# Patient Record
Sex: FEMALE | Race: WHITE | Employment: FULL TIME | URBAN - METROPOLITAN AREA
[De-identification: names, ages, dates, MRNs, and addresses within clinical notes are randomized per-mention and may not be internally consistent; named-entity substitution may affect disease eponyms.]

---

## 2017-02-20 ENCOUNTER — TELEPHONE (OUTPATIENT)
Dept: OBGYN | Facility: CLINIC | Age: 42
End: 2017-02-20

## 2018-01-05 ENCOUNTER — OFFICE VISIT (OUTPATIENT)
Dept: FAMILY MEDICINE | Facility: CLINIC | Age: 43
End: 2018-01-05
Payer: COMMERCIAL

## 2018-01-05 VITALS
SYSTOLIC BLOOD PRESSURE: 88 MMHG | DIASTOLIC BLOOD PRESSURE: 56 MMHG | WEIGHT: 117.4 LBS | BODY MASS INDEX: 20.04 KG/M2 | RESPIRATION RATE: 14 BRPM | HEIGHT: 64 IN | OXYGEN SATURATION: 100 % | TEMPERATURE: 97.2 F | HEART RATE: 72 BPM

## 2018-01-05 DIAGNOSIS — H60.393 INFECTIVE OTITIS EXTERNA, BILATERAL: Primary | ICD-10-CM

## 2018-01-05 PROCEDURE — 99213 OFFICE O/P EST LOW 20 MIN: CPT | Performed by: FAMILY MEDICINE

## 2018-01-05 RX ORDER — NEOMYCIN SULFATE, POLYMYXIN B SULFATE AND HYDROCORTISONE 10; 3.5; 1 MG/ML; MG/ML; [USP'U]/ML
4 SUSPENSION/ DROPS AURICULAR (OTIC) 4 TIMES DAILY
Qty: 10 ML | Refills: 0 | Status: SHIPPED | OUTPATIENT
Start: 2018-01-05 | End: 2018-01-05

## 2018-01-05 RX ORDER — NEOMYCIN SULFATE, POLYMYXIN B SULFATE AND HYDROCORTISONE 10; 3.5; 1 MG/ML; MG/ML; [USP'U]/ML
4 SUSPENSION/ DROPS AURICULAR (OTIC) 4 TIMES DAILY
Qty: 10 ML | Refills: 3 | Status: SHIPPED | OUTPATIENT
Start: 2018-01-05 | End: 2018-01-25

## 2018-01-05 NOTE — PROGRESS NOTES
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 Also melting wax. Denies noticing any blood. Denies any foul smell. Denies any ear pain or hearing changes.   The patient had the ear concern for the last 6 months self treating with above medications/remides.   Patient Active Problem List   Diagnosis     Spinal stenosis in cervical region     Past Surgical History:   Procedure Laterality Date     ENT SURGERY      oral surgery as a child     FUSION CERVICAL ANTERIOR TWO LEVELS  1/14/2013    Procedure: FUSION CERVICAL ANTERIOR TWO LEVELS;  C5-6 AND C6-7 ANTERIOR DISCECTOMY AND FUSION WITH MIRTA ANTERIOR INSTRUMENTATION (C-ARM, MIDAS, CONTRAVES) ;  Surgeon: Vince Frederick MD;  Location:  OR       Social History   Substance Use Topics     Smoking status: Never Smoker     Smokeless tobacco: Never Used     Alcohol use 0.0 oz/week     0 Standard drinks or equivalent per week      Comment: 2 wine daily     Family History   Problem Relation Age of Onset     Breast Cancer Maternal Aunt      Pt's mother tested and negative     Uterine Cancer Maternal Grandmother          ROS:  Constitutional, HEENT, cardiovascular, pulmonary, gi and gu systems are negative, except as otherwise noted.      OBJECTIVE:   BP (!) 88/56  Pulse 72  Temp 97.2  F (36.2  C) (Tympanic)  Resp 14  Ht 5' 4.25\" (1.632 m)  Wt 117 lb " 6.4 oz (53.3 kg)  SpO2 100%  Breastfeeding? No  BMI 20 kg/m2  Body mass index is 20 kg/(m^2).  GENERAL: healthy, alert and no distress, younger than stated age.   HENT: erythema and crusting of bilateral external ear canals worse on the right than the left. TM are intact and ear canal is patent. Minimal wax was noted.   PSYCH: mentation appears normal, affect normal/bright    Diagnostic Test Results:  none     ASSESSMENT/PLAN:   1. Infective otitis externa, bilateral  Assessment: Patient has a hx of chronic otitis externa. Currently concerned about the excessive wax draining out of both ears. Patient was given a prescription.   Plan:  - neomycin-polymyxin-hydrocortisone (CORTISPORIN) 3.5-13619-8 otic suspension; Place 4 drops into both ears 4 times daily  Dispense: 10 mL; Refill: 0    Meg Sanabria MD  [facility]  PAGER: [phone]

## 2018-01-05 NOTE — MR AVS SNAPSHOT
After Visit Summary   1/5/2018    Shaheen Flaherty    MRN: [id number]           Patient Information     Date Of Birth          1975        Visit Information        Provider Department      1/5/2018 2:30 PM Meg Sanabria MD [facility]        Today's Diagnoses     Infective otitis externa, bilateral    -  1      Care Instructions      Anatomy of the Ear    The ear is a complex and delicate organ. It collects sound waves so you can hear the world around you. The ear also has a second function--it helps you keep your balance. Your ear can be divided into 3 parts. The outer ear and middle ear help collect and amplify sound. The inner ear converts sound waves to messages that are sent to the brain. The inner ear also senses the movement and position of your head and body so you can maintain your balance and see clearly, even when you change positions.  The mastoid bone surrounds the middle ear. The external ear collects sound waves. The ear canal carries sound waves to the eardrum. The eardrum vibrates from sound waves, setting the middle ear bones in motion. The middle ear bones (ossicles) vibrate, transmitting sound waves to the inner ear. When the ear is healthy, air pressure remains balanced in the middle ear. The eustachian tube helps control air pressure in the middle ear. The semicircular canals help maintain balance. The vestibular nerve carries balance signals to the brain. The auditory nerve carries sound signals to the brain. The cochlea picks up sound waves and makes nerve signals.     Date Last Reviewed: 10/1/2016    5095-5064 [employer]. [address] [place] [address]. All rights reserved. This information is not intended as a substitute for professional medical care. Always follow your healthcare professional's instructions.        External Ear Infection (Adult)    External otitis (also called  swimmer s ear ) is an infection in the ear canal. It  is often caused by bacteria or fungus. It can occur a few days after water gets trapped in the ear canal (from swimming or bathing). It can also occur after cleaning too deeply in the ear canal with a cotton swab or other object. Sometimes, hair care products get into the ear canal and cause this problem.  Symptoms can include pain, fever, itching, redness, drainage, or swelling of the ear canal. Temporary hearing loss may also occur.  Home care    Do not try to clean the ear canal. This can push pus and bacteria deeper into the canal.    Use prescribed ear drops as directed. These help reduce swelling and fight the infection. If an ear wick was placed in the ear canal, apply drops right onto the end of the wick. The wick will draw the medication into the ear canal even if it is swollen closed.    A cotton ball may be loosely placed in the outer ear to absorb any drainage.    You may use acetaminophen or ibuprofen to control pain, unless another medication was prescribed. Note: If you have chronic liver or kidney disease or ever had a stomach ulcer or GI bleeding, talk to your health care provider before taking any of these medications.    Do not allow water to get into your ear when bathing. Also, avoid swimming until the infection has cleared.  Prevention    Keep your ears dry. This helps lower the risk of infection. Dry your ears with a towel or hair dryer after getting wet. Also, use ear plugs when swimming.    Do not stick any objects in the ear to remove wax.    If you feel water trapped in your ear, use ear drops right away. You can get these drops over the counter at most drugstores. They work by removing water from the ear canal.  Follow-up care  Follow up with your health care provider in one week, or as advised.  When to seek medical advice  Call your health care provider right away if any of these occur:    Ear pain becomes worse or doesn t improve after 3 days of treatment    Redness or swelling of the  "outer ear occurs or gets worse    Headache    Painful or stiff neck    Drowsiness or confusion    Fever of 100.4 F (38 C) or higher, or as directed by your health care provider    Seizure  Date Last Reviewed: 3/22/2015    5073-5114 [employer]. [address] [place] [address], [place] [address]. All rights reserved. This information is not intended as a substitute for professional medical care. Always follow your healthcare professional's instructions.                Follow-ups after your visit        Your next 10 appointments already scheduled     Jan 09, 2018 11:30 AM CST   (Arrive by 11:15 AM)   MA SCREENING DIGITAL BILATERAL with SHBCMA2   [facility] [place] [facility] ([facility])    [address] [place] [address], [address]  [facility] [address] [phone]           Do not use any powder, lotion or deodorant under your arms or on your breast. If you do, we will ask you to remove it before your exam.  Wear comfortable, two-piece clothing.  If you have any allergies, tell your care team.  Bring any previous mammograms from other facilities or have them mailed to the breast center. Three-dimensional (3D) mammograms are available at [place] locations in [facility], [place], [facility], [place], [place], and [place]. [facility] locations include [place] and [facility] in [place]. Benefits of 3D mammograms include: - Improved rate of cancer detection - Decreases your chance of having to go back for more tests, which means fewer: - \"False-positive\" results (This means that there is an abnormal area but it isn't cancer.) - Invasive testing procedures, such as a biopsy or surgery - Can provide clearer images of the breast if you have dense breast tissue. 3D mammography is an optional exam that anyone can have with a 2D mammogram. It doesn't replace or take the place of a 2D mammogram. 2D mammograms remain an effective screening test for all women.  Not " "all insurance companies cover the cost of a 3D mammogram. Check with your insurance.              Who to contact     If you have questions or need follow up information about today's clinic visit or your schedule please contact [facility] directly at [phone].  Normal or non-critical lab and imaging results will be communicated to you by Ranberryhart, letter or phone within 4 business days after the clinic has received the results. If you do not hear from us within 7 days, please contact the clinic through Ranberry[employer] or phone. If you have a critical or abnormal lab result, we will notify you by phone as soon as possible.  Submit refill requests through [employer] or call your pharmacy and they will forward the refill request to us. Please allow 3 business days for your refill to be completed.          Additional Information About Your Visit        [facility] Information     [employer] lets you send messages to your doctor, view your test results, renew your prescriptions, schedule appointments and more. To sign up, go to [email or web address] . Click on \"Log in\" on the left side of the screen, which will take you to the Welcome page. Then click on \"Sign up Now\" on the right side of the page.     You will be asked to enter the access code listed below, as well as some personal information. Please follow the directions to create your username and password.     Your access code is: [email or web address]  Expires: 2018  3:02 PM     Your access code will  in 90 days. If you need help or a new code, please call your [facility] or [phone].        Care EveryWhere ID     This is your Care EveryWhere ID. This could be used by other organizations to access your [facility] medical records  [id number]        Your Vitals Were     Pulse Temperature Respirations Height Pulse Oximetry Breastfeeding?    72 97.2  F (36.2  C) (Tympanic) 14 5' 4.25\" (1.632 m) 100% No    BMI (Body Mass Index)                   20 kg/m2      "       Blood Pressure from Last 3 Encounters:   01/05/18 (!) 88/56   02/19/16 118/60   11/05/15 100/70    Weight from Last 3 Encounters:   01/05/18 117 lb 6.4 oz (53.3 kg)   02/19/16 116 lb (52.6 kg)   11/05/15 117 lb (53.1 kg)              Today, you had the following     No orders found for display         Today's Medication Changes          These changes are accurate as of: 1/5/18  3:02 PM.  If you have any questions, ask your nurse or doctor.               Start taking these medicines.        Dose/Directions    neomycin-polymyxin-hydrocortisone 3.5-84582-1 otic suspension   Commonly known as:  CORTISPORIN   Used for:  Infective otitis externa, bilateral   Started by:  Meg Sanabria MD        Dose:  4 drop   Place 4 drops into both ears 4 times daily   Quantity:  10 mL   Refills:  0            Where to get your medicines      These medications were sent to [facility] - [place], [place] - [address] [place] [id number] [place] [address]     Phone:  [phone]     neomycin-polymyxin-hydrocortisone 3.5-00310-8 otic suspension                Primary Care Provider Fax #    [facility] [phone] [address] [place] [address]        Equal Access to Services     NELDA DYE AH: Hadii randy ratliff hadasho Soleno, waaxda luqadaha, qaybta kaalmada malena, odalis jay. So [facility] [phone].    ATENCIÓN: Si habla español, tiene a brush disposición servicios gratuitos de asistencia lingüística. rTevor al [phone].    We comply with applicable federal civil rights laws and [place] laws. We do not discriminate on the basis of race, color, national origin, age, disability, sex, sexual orientation, or gender identity.            Thank you!     Thank you for choosing [facility]  for your care. Our goal is always to provide you with excellent care. Hearing back from our patients is one way we can  continue to improve our services. Please take a few minutes to complete the written survey that you may receive in the mail after your visit with us. Thank you!             Your Updated Medication List - Protect others around you: Learn how to safely use, store and throw away your medicines at www.disposemymeds.org.          This list is accurate as of: 1/5/18  3:02 PM.  Always use your most recent med list.                   Brand Name Dispense Instructions for use Diagnosis    multivitamin, therapeutic with minerals Tabs tablet      Take 1 tablet by mouth daily        neomycin-polymyxin-hydrocortisone 3.5-40421-8 otic suspension    CORTISPORIN    10 mL    Place 4 drops into both ears 4 times daily    Infective otitis externa, bilateral       valACYclovir 500 MG tablet    VALTREX    6 tablet    Take one tab by mouth twice daily for 3 days. Start at first onset of symptoms    HSV infection

## 2018-01-05 NOTE — NURSING NOTE
"Chief Complaint   Patient presents with     Establish Care     Ear Problem     possible infection-chronic swimmers ear x6 months       Initial BP (!) 88/56  Pulse 72  Temp 97.2  F (36.2  C) (Tympanic)  Resp 14  Ht 5' 4.25\" (1.632 m)  Wt 117 lb 6.4 oz (53.3 kg)  SpO2 100%  Breastfeeding? No  BMI 20 kg/m2 Estimated body mass index is 20 kg/(m^2) as calculated from the following:    Height as of this encounter: 5' 4.25\" (1.632 m).    Weight as of this encounter: 117 lb 6.4 oz (53.3 kg).  BP completed using cuff size: regular    Health Maintenance that is potentially due pending provider review:  Health Maintenance Due   Topic Date Due     TETANUS IMMUNIZATION (SYSTEM ASSIGNED)  03/14/1994     INFLUENZA VACCINE (SYSTEM ASSIGNED)  09/01/2017         Vaccines declined  "

## 2018-01-05 NOTE — PATIENT INSTRUCTIONS
Anatomy of the Ear    The ear is a complex and delicate organ. It collects sound waves so you can hear the world around you. The ear also has a second function--it helps you keep your balance. Your ear can be divided into 3 parts. The outer ear and middle ear help collect and amplify sound. The inner ear converts sound waves to messages that are sent to the brain. The inner ear also senses the movement and position of your head and body so you can maintain your balance and see clearly, even when you change positions.  The mastoid bone surrounds the middle ear. The external ear collects sound waves. The ear canal carries sound waves to the eardrum. The eardrum vibrates from sound waves, setting the middle ear bones in motion. The middle ear bones (ossicles) vibrate, transmitting sound waves to the inner ear. When the ear is healthy, air pressure remains balanced in the middle ear. The eustachian tube helps control air pressure in the middle ear. The semicircular canals help maintain balance. The vestibular nerve carries balance signals to the brain. The auditory nerve carries sound signals to the brain. The cochlea picks up sound waves and makes nerve signals.     Date Last Reviewed: 10/1/2016    8798-8124 [employer]. [address] [place] [address]. All rights reserved. This information is not intended as a substitute for professional medical care. Always follow your healthcare professional's instructions.        External Ear Infection (Adult)    External otitis (also called  swimmer s ear ) is an infection in the ear canal. It is often caused by bacteria or fungus. It can occur a few days after water gets trapped in the ear canal (from swimming or bathing). It can also occur after cleaning too deeply in the ear canal with a cotton swab or other object. Sometimes, hair care products get into the ear canal and cause this problem.  Symptoms can include pain, fever, itching, redness,  drainage, or swelling of the ear canal. Temporary hearing loss may also occur.  Home care    Do not try to clean the ear canal. This can push pus and bacteria deeper into the canal.    Use prescribed ear drops as directed. These help reduce swelling and fight the infection. If an ear wick was placed in the ear canal, apply drops right onto the end of the wick. The wick will draw the medication into the ear canal even if it is swollen closed.    A cotton ball may be loosely placed in the outer ear to absorb any drainage.    You may use acetaminophen or ibuprofen to control pain, unless another medication was prescribed. Note: If you have chronic liver or kidney disease or ever had a stomach ulcer or GI bleeding, talk to your health care provider before taking any of these medications.    Do not allow water to get into your ear when bathing. Also, avoid swimming until the infection has cleared.  Prevention    Keep your ears dry. This helps lower the risk of infection. Dry your ears with a towel or hair dryer after getting wet. Also, use ear plugs when swimming.    Do not stick any objects in the ear to remove wax.    If you feel water trapped in your ear, use ear drops right away. You can get these drops over the counter at most drugstores. They work by removing water from the ear canal.  Follow-up care  Follow up with your health care provider in one week, or as advised.  When to seek medical advice  Call your health care provider right away if any of these occur:    Ear pain becomes worse or doesn t improve after 3 days of treatment    Redness or swelling of the outer ear occurs or gets worse    Headache    Painful or stiff neck    Drowsiness or confusion    Fever of 100.4 F (38 C) or higher, or as directed by your health care provider    Seizure  Date Last Reviewed: 3/22/2015    4086-1039 [employer]. [address] [place] [address], [place] [address]. All rights reserved. This information is not intended as a  substitute for professional medical care. Always follow your healthcare professional's instructions.

## 2018-01-09 ENCOUNTER — HOSPITAL ENCOUNTER (OUTPATIENT)
Dept: MAMMOGRAPHY | Facility: CLINIC | Age: 43
Discharge: HOME | End: 2018-01-09
Attending: OBSTETRICS & GYNECOLOGY | Admitting: OBSTETRICS & GYNECOLOGY
Payer: COMMERCIAL

## 2018-01-09 DIAGNOSIS — Z12.31 VISIT FOR SCREENING MAMMOGRAM: ICD-10-CM

## 2018-01-09 PROCEDURE — 77067 SCR MAMMO BI INCL CAD: CPT

## 2018-01-25 ENCOUNTER — OFFICE VISIT (OUTPATIENT)
Dept: FAMILY MEDICINE | Facility: CLINIC | Age: 43
End: 2018-01-25
Payer: COMMERCIAL

## 2018-01-25 ENCOUNTER — NURSE TRIAGE (OUTPATIENT)
Dept: NURSING | Facility: CLINIC | Age: 43
End: 2018-01-25

## 2018-01-25 ENCOUNTER — TELEPHONE (OUTPATIENT)
Dept: FAMILY MEDICINE | Facility: CLINIC | Age: 43
End: 2018-01-25

## 2018-01-25 VITALS
BODY MASS INDEX: 20.33 KG/M2 | SYSTOLIC BLOOD PRESSURE: 96 MMHG | WEIGHT: 119.1 LBS | HEIGHT: 64 IN | DIASTOLIC BLOOD PRESSURE: 62 MMHG | TEMPERATURE: 99.9 F

## 2018-01-25 DIAGNOSIS — J02.0 PHARYNGITIS DUE TO STREPTOCOCCUS SPECIES: Primary | ICD-10-CM

## 2018-01-25 LAB
DEPRECATED S PYO AG THROAT QL EIA: ABNORMAL
SPECIMEN SOURCE: ABNORMAL

## 2018-01-25 PROCEDURE — 99213 OFFICE O/P EST LOW 20 MIN: CPT | Performed by: FAMILY MEDICINE

## 2018-01-25 PROCEDURE — 87880 STREP A ASSAY W/OPTIC: CPT | Performed by: FAMILY MEDICINE

## 2018-01-25 RX ORDER — AZITHROMYCIN 250 MG/1
TABLET, FILM COATED ORAL
Qty: 6 TABLET | Refills: 0 | Status: SHIPPED | OUTPATIENT
Start: 2018-01-25 | End: 2018-03-06

## 2018-01-25 NOTE — MR AVS SNAPSHOT
After Visit Summary   1/25/2018    Shaheen Flaherty    MRN: [id number]           Patient Information     Date Of Birth          1975        Visit Information        Provider Department      1/25/2018 4:45 PM Meg Sanbaria MD [facility]        Today's Diagnoses     Throat pain    -  1      Care Instructions       * PHARYNGITIS (Sore Throat),REPORT PENDING    Pharyngitis (sore throat) is often due to a virus, but can also be caused by the  strep  bacteria. This is called  strep throat . Both viral and strep infection can cause throat pain that is worse when swallowing, aching all over with headache and fever. Both types of infections are contagious. They may be spread by coughing, kissing or touching others after touching your mouth or nose, so wash your hands often.  A test has been done to determine whether or not you have strep throat. If it is positive for strep infection you will usually need to take antibiotics. If the test is negative, you probably have a viral pharyngitis, and antibiotic treatment will not help you recover.  HOME CARE:    If your symptoms are severe, rest at home for the first 2-3 days. If you are told that your test is positive for strep, you should be off work and school for the first two days of antibiotic treatment. After that, you will no longer be as contagious.    Children: Use acetaminophen (Tylenol) for fever, fussiness or discomfort. In infants over six months of age, you may use ibuprofen (Children's Motrin) instead of Tylenol. [NOTE: If your child has chronic liver or kidney disease or ever had a stomach ulcer or GI bleeding, talk with your doctor before using these medicines.]   (Aspirin should never be used in anyone under 18 years of age who is ill with a fever. It may cause severe liver damage.)  Adults: You may use acetaminophen (Tylenol) 650-1000 mg every 6 hours or ibuprofen (Motrin, Advil) 600 mg every 6-8 hours with food to control pain,  if you are able to take these medicines. [NOTE: If you have chronic liver or kidney disease or ever had a stomach ulcer or GI bleeding, talk with your doctor before using these medicines.]    Throat lozenges or sprays (Chloraseptic and others), or gargling with warm salt water will reduce throat pain. Dissolve 1/2 teaspoon of salt in 1 glass of warm water. This is especially useful just before meals.     FOLLOW UP with your doctor as advised by our staff if you are not improving over the next week.  GET PROMPT MEDICAL ATTENTION  if any of the following occur:    Fever over 101 F (38.3 C) for more than three days    New or worsening ear pain, sinus pain or headache    Unable to swallow liquids or open your mouth wide due to throat pain    Trouble breathing    Muffled voice    New rash       6941-5646 [employer]. [address] [place] [address]. All rights reserved. This information is not intended as a substitute for professional medical care. Always follow your healthcare professional's instructions.  This information has been modified by your health care provider with permission from the publisher.    Self-Care for Sore Throats    Sore throats happen for many reasons, such as colds, allergies, and infections caused by viruses or bacteria. In any case, your throat becomes red and sore. Your goal for self-care is to reduce your discomfort while giving your throat a chance to heal.  Moisten and soothe your throat  Tips include the following:    Try a sip of water first thing after waking up.    Keep your throat moist by drinking 6 or more glasses of clear liquids every day.    Run a cool-air humidifier in your room overnight.    Avoid cigarette smoke.     Suck on throat lozenges, cough drops, hard candy, ice chips, or frozen fruit-juice bars. Use the sugar-free versions if your diet or medical condition requires them.  Gargle to ease irritation  Gargling every hour or 2 can ease irritation.  Try gargling with 1 of these solutions:    1/4 teaspoon of salt in 1/2 cup of warm water    An over-the-counter anesthetic gargle  Use medicine for more relief  Over-the-counter medicine can reduce sore throat symptoms. Ask your pharmacist if you have questions about which medicine to use:    Ease pain with anesthetic sprays. Aspirin or an aspirin substitute also helps. Remember, never give aspirin to anyone 18 or younger, or if you are already taking blood thinners.     For sore throats caused by allergies, try antihistamines to block the allergic reaction.    Remember: unless a sore throat is caused by a bacterial infection, antibiotics won t help you.  Prevent future sore throats  Prevention tips include the following:    Stop smoking or reduce contact with secondhand smoke. Smoke irritates the tender throat lining.    Limit contact with pets and with allergy-causing substances, such as pollen and mold.    When you re around someone with a sore throat or cold, wash your hands often to keep viruses or bacteria from spreading.    Don t strain your vocal cords.  Call your healthcare provider  Contact your healthcare provider if you have:    A temperature over 101 F (38.3 C)    White spots on the throat    Great difficulty swallowing    Trouble breathing    A skin rash    Recent exposure to someone else with strep bacteria    Severe hoarseness and swollen glands in the neck or jaw   Date Last Reviewed: 8/1/2016 2000-2017 [employer]. [address] [place] [address]. All rights reserved. This information is not intended as a substitute for professional medical care. Always follow your healthcare professional's instructions.                Follow-ups after your visit        Follow-up notes from your care team     Return if symptoms worsen or fail to improve.      Who to contact     If you have questions or need follow up information about today's clinic visit or your schedule please contact  "[facility] directly at [phone].  Normal or non-critical lab and imaging results will be communicated to you by MyChart, letter or phone within 4 business days after the clinic has received the results. If you do not hear from us within 7 days, please contact the clinic through [employer] or phone. If you have a critical or abnormal lab result, we will notify you by phone as soon as possible.  Submit refill requests through [facility] or call your pharmacy and they will forward the refill request to us. Please allow 3 business days for your refill to be completed.          Additional Information About Your Visit        [employer] Information     [facility] lets you send messages to your doctor, view your test results, renew your prescriptions, schedule appointments and more. To sign up, go to [email or web address] . Click on \"Log in\" on the left side of the screen, which will take you to the Welcome page. Then click on \"Sign up Now\" on the right side of the page.     You will be asked to enter the access code listed below, as well as some personal information. Please follow the directions to create your username and password.     Your access code is: [email or web address]  Expires: 2018  3:02 PM     Your access code will  in 90 days. If you need help or a new code, please call your [facility] clinic or [phone].        Care EveryWhere ID     This is your Care EveryWhere ID. This could be used by other organizations to access your [facility] medical records  [id number]        Your Vitals Were     Temperature Height BMI (Body Mass Index)             99.9  F (37.7  C) (Tympanic) 5' 4.25\" (1.632 m) 20.28 kg/m2          Blood Pressure from Last 3 Encounters:   18 96/62   18 (!) 88/56   16 118/60    Weight from Last 3 Encounters:   18 119 lb 1.6 oz (54 kg)   18 117 lb 6.4 oz (53.3 kg)   16 116 lb (52.6 kg)              We Performed the Following     Rapid strep screen        " Primary Care Provider Office Phone # Fax #    [facility] [phone] [address]  [facility] [address]        Equal Access to Services     NELDA DYE : Hadii randy ku hadmerlineo Soomaali, waaxda luqadaha, qaybta kaalmada adeegyada, odalis mckenzie laHenriqueadrian jay. So [facility] [phone].    ATENCIÓN: Si habla español, tiene a brush disposición servicios gratuitos de asistencia lingüística. Llame al [phone].    We comply with applicable federal civil rights laws and [place] laws. We do not discriminate on the basis of race, color, national origin, age, disability, sex, sexual orientation, or gender identity.            Thank you!     Thank you for choosing [facility]  for your care. Our goal is always to provide you with excellent care. Hearing back from our patients is one way we can continue to improve our services. Please take a few minutes to complete the written survey that you may receive in the mail after your visit with us. Thank you!             Your Updated Medication List - Protect others around you: Learn how to safely use, store and throw away your medicines at www.disposemymeds.org.          This list is accurate as of 1/25/18  5:05 PM.  Always use your most recent med list.                   Brand Name Dispense Instructions for use Diagnosis    multivitamin, therapeutic with minerals Tabs tablet      Take 1 tablet by mouth daily        valACYclovir 500 MG tablet    VALTREX    6 tablet    Take one tab by mouth twice daily for 3 days. Start at first onset of symptoms    HSV infection

## 2018-01-25 NOTE — NURSING NOTE
"Chief Complaint   Patient presents with     Pharyngitis     ongoing,      BP 96/62  Temp 99.9  F (37.7  C) (Tympanic)  Ht 5' 4.25\" (1.632 m)  Wt 119 lb 1.6 oz (54 kg)  BMI 20.28 kg/m2 Estimated body mass index is 20.28 kg/(m^2) as calculated from the following:    Height as of this encounter: 5' 4.25\" (1.632 m).    Weight as of this encounter: 119 lb 1.6 oz (54 kg).  Medication Reconciliation: complete      Health Maintenance due pending provider review:  NONE    n/a    Shawanda Serra CMA  "

## 2018-01-25 NOTE — TELEPHONE ENCOUNTER
Reason for call:  Patient reporting a symptom    Symptom or request: sore throat, hurts all the time,  She had other symptoms like headache and fever but said they went away, now its the throat and it hurts badly, she was crying. Please advise.     Duration (how long have symptoms been present): Sunday    Have you been treated for this before? No    Additional comments:     Phone Number patient can be reached at:  Home number on file [phone] (home)    Best Time:  asap    Can we leave a detailed message on this number:  YES    Call taken on 1/25/2018 at 3:13 PM by Marla Reyes

## 2018-01-25 NOTE — PROGRESS NOTES
SUBJECTIVE:   Shaheen Flaherty is a 42 year old female who presents to clinic today for the following health issues:      RESPIRATORY SYMPTOMS      Duration: ongoing    Description  sore throat, facial pain/pressure, ear pain both and headache    Severity: moderate    Accompanying signs and symptoms: 3+ weeks ago dx with ear inf    History (predisposing factors):  none    Precipitating or alleviating factors: None    Therapies tried and outcome:  rest and fluids OTC NSAID    Patient presents to clinic for fevers and sore throat. Reports symptoms started after an ear infection 3 weeks ago. She is concerned that the otic ear drops caused a throat infection.  Reports  throat pain, unable to eat solids only liquids. Feeling warm but have not measured her tempeture. She denies any chest pain or shortness of breath. Is not aware of any sick exposure.      Problem list and histories reviewed & adjusted, as indicated.  Additional history: as documented    Patient Active Problem List   Diagnosis     Spinal stenosis in cervical region     Past Surgical History:   Procedure Laterality Date     ENT SURGERY      oral surgery as a child     FUSION CERVICAL ANTERIOR TWO LEVELS  1/14/2013    Procedure: FUSION CERVICAL ANTERIOR TWO LEVELS;  C5-6 AND C6-7 ANTERIOR DISCECTOMY AND FUSION WITH MIRTA ANTERIOR INSTRUMENTATION (C-ARM, MIDAS, CONTRAVES) ;  Surgeon: Vince Frederick MD;  Location:  OR       Social History   Substance Use Topics     Smoking status: Never Smoker     Smokeless tobacco: Never Used     Alcohol use 0.0 oz/week     0 Standard drinks or equivalent per week      Comment: 2 wine daily     Family History   Problem Relation Age of Onset     Breast Cancer Maternal Aunt      Pt's mother tested and negative     Uterine Cancer Maternal Grandmother            Reviewed and updated as needed this visit by clinical staff    ROS:  Constitutional, HEENT, cardiovascular, pulmonary, gi and gu systems are negative,  "except as otherwise noted.    OBJECTIVE:     BP 96/62  Temp 99.9  F (37.7  C) (Tympanic)  Ht 5' 4.25\" (1.632 m)  Wt 119 lb 1.6 oz (54 kg)  BMI 20.28 kg/m2  Body mass index is 20.28 kg/(m^2).  GENERAL: healthy, alert and no distress  HENT: Bulging erythematous right tympanic membrane.  Right tonsillar enlargement with erythema and petechia on the soft palate.  Right anterior cervical lymphadenopathy.  NECK: no adenopathy, no asymmetry, masses, or scars and thyroid normal to palpation  RESP: lungs clear to auscultation - no rales, rhonchi or wheezes  CV: regular rate and rhythm, normal S1 S2, no S3 or S4, no murmur, click or rub, no peripheral edema and peripheral pulses strong    Diagnostic Test Results:  Results for orders placed or performed in visit on 01/25/18 (from the past 24 hour(s))   Rapid strep screen   Result Value Ref Range    Specimen Description Throat     Rapid Strep A Screen (A)      POSITIVE: Group A Streptococcal antigen detected by immunoassay.       ASSESSMENT/PLAN:   1. Pharyngitis due to Streptococcus species  Assessment: Physical exam is consistent with an acute infection.  Rapid strep was pending when patient left the clinic prior to getting the results of rapid strep.  She was called with the results twice and we left a voice message after the second phone call.  Due to the patient's history of allergy to penicillin, prescription of azithromycin was sent to her pharmacy.  Plan:  - Rapid strep screen  - azithromycin (ZITHROMAX) 250 MG tablet; Two tablets first day, then one tablet daily for four days.  Dispense: 6 tablet; Refill: 0      **MS is sent to our triage team was called patient and inform her about the positive results as well the antibiotics.     Meg Sanabria MD  [facility]  PAGER: [phone]      "

## 2018-01-25 NOTE — PATIENT INSTRUCTIONS
* PHARYNGITIS (Sore Throat),REPORT PENDING    Pharyngitis (sore throat) is often due to a virus, but can also be caused by the  strep  bacteria. This is called  strep throat . Both viral and strep infection can cause throat pain that is worse when swallowing, aching all over with headache and fever. Both types of infections are contagious. They may be spread by coughing, kissing or touching others after touching your mouth or nose, so wash your hands often.  A test has been done to determine whether or not you have strep throat. If it is positive for strep infection you will usually need to take antibiotics. If the test is negative, you probably have a viral pharyngitis, and antibiotic treatment will not help you recover.  HOME CARE:    If your symptoms are severe, rest at home for the first 2-3 days. If you are told that your test is positive for strep, you should be off work and school for the first two days of antibiotic treatment. After that, you will no longer be as contagious.    Children: Use acetaminophen (Tylenol) for fever, fussiness or discomfort. In infants over six months of age, you may use ibuprofen (Children's Motrin) instead of Tylenol. [NOTE: If your child has chronic liver or kidney disease or ever had a stomach ulcer or GI bleeding, talk with your doctor before using these medicines.]   (Aspirin should never be used in anyone under 18 years of age who is ill with a fever. It may cause severe liver damage.)  Adults: You may use acetaminophen (Tylenol) 650-1000 mg every 6 hours or ibuprofen (Motrin, Advil) 600 mg every 6-8 hours with food to control pain, if you are able to take these medicines. [NOTE: If you have chronic liver or kidney disease or ever had a stomach ulcer or GI bleeding, talk with your doctor before using these medicines.]    Throat lozenges or sprays (Chloraseptic and others), or gargling with warm salt water will reduce throat pain. Dissolve 1/2 teaspoon of salt in 1 glass of  warm water. This is especially useful just before meals.     FOLLOW UP with your doctor as advised by our staff if you are not improving over the next week.  GET PROMPT MEDICAL ATTENTION  if any of the following occur:    Fever over 101 F (38.3 C) for more than three days    New or worsening ear pain, sinus pain or headache    Unable to swallow liquids or open your mouth wide due to throat pain    Trouble breathing    Muffled voice    New rash       9236-9536 [employer]. [address] [place] [address], [place] [address]. All rights reserved. This information is not intended as a substitute for professional medical care. Always follow your healthcare professional's instructions.  This information has been modified by your health care provider with permission from the publisher.    Self-Care for Sore Throats    Sore throats happen for many reasons, such as colds, allergies, and infections caused by viruses or bacteria. In any case, your throat becomes red and sore. Your goal for self-care is to reduce your discomfort while giving your throat a chance to heal.  Moisten and soothe your throat  Tips include the following:    Try a sip of water first thing after waking up.    Keep your throat moist by drinking 6 or more glasses of clear liquids every day.    Run a cool-air humidifier in your room overnight.    Avoid cigarette smoke.     Suck on throat lozenges, cough drops, hard candy, ice chips, or frozen fruit-juice bars. Use the sugar-free versions if your diet or medical condition requires them.  Gargle to ease irritation  Gargling every hour or 2 can ease irritation. Try gargling with 1 of these solutions:    1/4 teaspoon of salt in 1/2 cup of warm water    An over-the-counter anesthetic gargle  Use medicine for more relief  Over-the-counter medicine can reduce sore throat symptoms. Ask your pharmacist if you have questions about which medicine to use:    Ease pain with anesthetic sprays. Aspirin or an aspirin  substitute also helps. Remember, never give aspirin to anyone 18 or younger, or if you are already taking blood thinners.     For sore throats caused by allergies, try antihistamines to block the allergic reaction.    Remember: unless a sore throat is caused by a bacterial infection, antibiotics won t help you.  Prevent future sore throats  Prevention tips include the following:    Stop smoking or reduce contact with secondhand smoke. Smoke irritates the tender throat lining.    Limit contact with pets and with allergy-causing substances, such as pollen and mold.    When you re around someone with a sore throat or cold, wash your hands often to keep viruses or bacteria from spreading.    Don t strain your vocal cords.  Call your healthcare provider  Contact your healthcare provider if you have:    A temperature over 101 F (38.3 C)    White spots on the throat    Great difficulty swallowing    Trouble breathing    A skin rash    Recent exposure to someone else with strep bacteria    Severe hoarseness and swollen glands in the neck or jaw   Date Last Reviewed: 8/1/2016 2000-2017 [employer]. [address] [place] [address], [place] [address]. All rights reserved. This information is not intended as a substitute for professional medical care. Always follow your healthcare professional's instructions.

## 2018-01-25 NOTE — TELEPHONE ENCOUNTER
Patient seen 1/5/2018   States she saw FS and was given ear drops  Thinks the ear drops let to her sore throat  States throat is so sore it hurts to swallow and it hurts even when she isn't swallowing  Had a headache couple days ago - this is now goal  Has temp of 100.8  Temp intermittent  Taking Tylenol which helps  Advised f/u appt    Next 5 appointments (look out 90 days)     Jan 25, 2018  4:45 PM CST   SHORT with Meg Sanabria MD   [facility] ([facility])    [address] [facility] [address] [phone]                Latia EBCERRIL RN

## 2018-01-26 ENCOUNTER — TELEPHONE (OUTPATIENT)
Dept: FAMILY MEDICINE | Facility: CLINIC | Age: 43
End: 2018-01-26

## 2018-01-26 NOTE — TELEPHONE ENCOUNTER
Notes Recorded by Meg Sanabria MD on 1/25/2018 at 5:54 PM  Please call the patient with the results. I called and left a voice message.   Due to her allergy to PCN, we sent a prescription of azithromycin for tx of strep.   We hope she feels better.     Meg Sanabria MD

## 2018-01-26 NOTE — TELEPHONE ENCOUNTER
Shaheen returning clinic call, states no msg left.  Did advise of + strep throat test, and script ordered to [facility].  Reviewed infection control related to strep throat.      Additional Information    [1] Follow-up call to recent contact AND [2] information only call, no triage required    Protocols used: INFORMATION ONLY CALL-ADULT-

## 2018-01-27 ENCOUNTER — HOSPITAL ENCOUNTER (EMERGENCY)
Facility: CLINIC | Age: 43
Discharge: HOME | End: 2018-01-28
Attending: EMERGENCY MEDICINE | Admitting: EMERGENCY MEDICINE
Payer: COMMERCIAL

## 2018-01-27 ENCOUNTER — APPOINTMENT (OUTPATIENT)
Dept: CT IMAGING | Facility: CLINIC | Age: 43
End: 2018-01-27
Attending: EMERGENCY MEDICINE
Payer: COMMERCIAL

## 2018-01-27 DIAGNOSIS — R07.0 THROAT PAIN: ICD-10-CM

## 2018-01-27 DIAGNOSIS — J36 PERITONSILLAR ABSCESS: ICD-10-CM

## 2018-01-27 LAB
ANION GAP SERPL CALCULATED.3IONS-SCNC: 5 MMOL/L (ref 3–14)
BASOPHILS # BLD AUTO: 0 10E9/L (ref 0–0.2)
BASOPHILS NFR BLD AUTO: 0.3 %
BUN SERPL-MCNC: 8 MG/DL (ref 7–30)
CALCIUM SERPL-MCNC: 8.4 MG/DL (ref 8.5–10.1)
CHLORIDE SERPL-SCNC: 102 MMOL/L (ref 94–109)
CO2 SERPL-SCNC: 30 MMOL/L (ref 20–32)
CREAT SERPL-MCNC: 0.61 MG/DL (ref 0.52–1.04)
DEPRECATED S PYO AG THROAT QL EIA: NORMAL
DIFFERENTIAL METHOD BLD: ABNORMAL
EOSINOPHIL # BLD AUTO: 0.1 10E9/L (ref 0–0.7)
EOSINOPHIL NFR BLD AUTO: 1 %
ERYTHROCYTE [DISTWIDTH] IN BLOOD BY AUTOMATED COUNT: 13.1 % (ref 10–15)
GFR SERPL CREATININE-BSD FRML MDRD: >90 ML/MIN/1.7M2
GLUCOSE SERPL-MCNC: 98 MG/DL (ref 70–99)
HCT VFR BLD AUTO: 31.3 % (ref 35–47)
HGB BLD-MCNC: 10.4 G/DL (ref 11.7–15.7)
IMM GRANULOCYTES # BLD: 0 10E9/L (ref 0–0.4)
IMM GRANULOCYTES NFR BLD: 0.3 %
LYMPHOCYTES # BLD AUTO: 1 10E9/L (ref 0.8–5.3)
LYMPHOCYTES NFR BLD AUTO: 8.4 %
MCH RBC QN AUTO: 32.2 PG (ref 26.5–33)
MCHC RBC AUTO-ENTMCNC: 33.2 G/DL (ref 31.5–36.5)
MCV RBC AUTO: 97 FL (ref 78–100)
MONOCYTES # BLD AUTO: 1.4 10E9/L (ref 0–1.3)
MONOCYTES NFR BLD AUTO: 12.1 %
NEUTROPHILS # BLD AUTO: 8.9 10E9/L (ref 1.6–8.3)
NEUTROPHILS NFR BLD AUTO: 77.9 %
NRBC # BLD AUTO: 0 10*3/UL
NRBC BLD AUTO-RTO: 0 /100
PLATELET # BLD AUTO: 359 10E9/L (ref 150–450)
POTASSIUM SERPL-SCNC: 4 MMOL/L (ref 3.4–5.3)
RBC # BLD AUTO: 3.23 10E12/L (ref 3.8–5.2)
SODIUM SERPL-SCNC: 137 MMOL/L (ref 133–144)
SPECIMEN SOURCE: NORMAL
WBC # BLD AUTO: 11.5 10E9/L (ref 4–11)

## 2018-01-27 PROCEDURE — 70491 CT SOFT TISSUE NECK W/DYE: CPT

## 2018-01-27 PROCEDURE — 96361 HYDRATE IV INFUSION ADD-ON: CPT

## 2018-01-27 PROCEDURE — 96374 THER/PROPH/DIAG INJ IV PUSH: CPT | Mod: 59

## 2018-01-27 PROCEDURE — 87081 CULTURE SCREEN ONLY: CPT | Performed by: EMERGENCY MEDICINE

## 2018-01-27 PROCEDURE — 25000132 ZZH RX MED GY IP 250 OP 250 PS 637: Performed by: EMERGENCY MEDICINE

## 2018-01-27 PROCEDURE — 42700 I&D ABSCESS PERITONSILLAR: CPT

## 2018-01-27 PROCEDURE — 99284 EMERGENCY DEPT VISIT MOD MDM: CPT | Mod: 25

## 2018-01-27 PROCEDURE — 85025 COMPLETE CBC W/AUTO DIFF WBC: CPT | Performed by: EMERGENCY MEDICINE

## 2018-01-27 PROCEDURE — 25000128 H RX IP 250 OP 636: Performed by: EMERGENCY MEDICINE

## 2018-01-27 PROCEDURE — 99285 EMERGENCY DEPT VISIT HI MDM: CPT | Mod: 25

## 2018-01-27 PROCEDURE — 87880 STREP A ASSAY W/OPTIC: CPT | Performed by: EMERGENCY MEDICINE

## 2018-01-27 PROCEDURE — 25000125 ZZHC RX 250: Performed by: EMERGENCY MEDICINE

## 2018-01-27 PROCEDURE — 80048 BASIC METABOLIC PNL TOTAL CA: CPT | Performed by: EMERGENCY MEDICINE

## 2018-01-27 RX ORDER — KETOROLAC TROMETHAMINE 15 MG/ML
15 INJECTION, SOLUTION INTRAMUSCULAR; INTRAVENOUS ONCE
Status: COMPLETED | OUTPATIENT
Start: 2018-01-27 | End: 2018-01-27

## 2018-01-27 RX ORDER — SODIUM CHLORIDE 9 MG/ML
1000 INJECTION, SOLUTION INTRAVENOUS CONTINUOUS
Status: DISCONTINUED | OUTPATIENT
Start: 2018-01-27 | End: 2018-01-28 | Stop reason: HOSPADM

## 2018-01-27 RX ORDER — DIPHENHYDRAMINE HCL 12.5MG/5ML
25 LIQUID (ML) ORAL ONCE
Status: COMPLETED | OUTPATIENT
Start: 2018-01-27 | End: 2018-01-27

## 2018-01-27 RX ORDER — IOPAMIDOL 755 MG/ML
80 INJECTION, SOLUTION INTRAVASCULAR ONCE
Status: COMPLETED | OUTPATIENT
Start: 2018-01-27 | End: 2018-01-27

## 2018-01-27 RX ADMIN — TOPICAL ANESTHETIC 0.5 ML: 200 SPRAY DENTAL; PERIODONTAL at 23:46

## 2018-01-27 RX ADMIN — SODIUM CHLORIDE 60 ML: 9 INJECTION, SOLUTION INTRAVENOUS at 22:07

## 2018-01-27 RX ADMIN — IOPAMIDOL 80 ML: 755 INJECTION, SOLUTION INTRAVENOUS at 22:07

## 2018-01-27 RX ADMIN — SODIUM CHLORIDE 1000 ML: 9 INJECTION, SOLUTION INTRAVENOUS at 21:33

## 2018-01-27 RX ADMIN — KETOROLAC TROMETHAMINE 15 MG: 15 INJECTION, SOLUTION INTRAMUSCULAR; INTRAVENOUS at 21:33

## 2018-01-27 RX ADMIN — DIPHENHYDRAMINE HYDROCHLORIDE 25 MG: 25 SOLUTION ORAL at 20:52

## 2018-01-27 ASSESSMENT — ENCOUNTER SYMPTOMS
SORE THROAT: 1
VOMITING: 0
NAUSEA: 0
TROUBLE SWALLOWING: 1

## 2018-01-27 NOTE — ED AVS SNAPSHOT
Emergency Department    [address] [place] [address]    Phone:  [phone]    Fax:  [phone]                                       Shaheen Flaherty   MRN: [id number]    Department:   Emergency Department   Date of Visit:  1/27/2018           After Visit Summary Signature Page     I have received my discharge instructions, and my questions have been answered. I have discussed any challenges I see with this plan with the nurse or doctor.    ..........................................................................................................................................  Patient/Patient Representative Signature      ..........................................................................................................................................  Patient Representative Print Name and Relationship to Patient    ..................................................               ................................................  Date                                            Time    ..........................................................................................................................................  Reviewed by Signature/Title    ...................................................              ..............................................  Date                                                            Time

## 2018-01-27 NOTE — ED AVS SNAPSHOT
Emergency Department    [address] [facility] [address]    Phone:  [phone]    Fax:  [phone]                                       Shaheen Flaherty   MRN: [id number]    Department:   Emergency Department   Date of Visit:  1/27/2018           Patient Information     Date Of Birth          1975        Your diagnoses for this visit were:     Peritonsillar abscess     Throat pain        You were seen by Lesia Dejesus MD.      Follow-up Information     Follow up with Peewee Zavala MD. Schedule an appointment as soon as possible for a visit in 2 days.    Specialty:  Otolaryngology    Contact information:    [facility]  [address] [id number] [phone]          Discharge Instructions       Please return to the ED if notice worsening pain, muffled voice, drooling, difficulty swallowing or opening your mouth, fevers >101 or other acute changes.  Please use tylenol and ibuprofen for pain.  Drink plenty of fluids and rest.      Discharge Instructions  Sore Throat  You were seen today for a sore throat.  Most (>80%) sore throats are caused by a virus. Antibiotics do not help with viral infections, but you can fight off the virus on your own.  In this case, your sore throat would be treated with medications for your pain and fever.    Strep throat is a kind of sore throat caused by Group A streptococcus bacteria.  This type of sore throat is treated with antibiotics.  If you had a rapid test done today for strep throat and it did not show infection, we may do a culture. If the culture shows you have strep throat, we will call you and get you a prescription for antibiotics.  Generally, every Emergency Department visit should have a follow-up clinic visit with either a primary or a specialty clinic/provider. Please follow-up as instructed by your emergency provider today.  Return to the Emergency Department if:    If you have difficulty breathing.    If  you are drooling because you are unable to swallow.    You become dehydrated due to difficulty drinking. Signs of dehydration include weakness, dry mouth, and urinating less than 3 times per day.    If you develop swelling of the neck or tongue.    If you develop a high fever with either severe or unusual headache or stiff neck.    Treatment:      Pain relief -- Non-prescription pain medications, such as Tylenol  (acetaminophen) or Motrin , Advil  (ibuprofen) are usually recommended for pain.  Do not use a medicine that you are allergic to, or if your provider has told you not to use it.    Soft or liquid diet. Concentrate on liquids to keep yourself hydrated. Cold liquids (popsicles, ice cream, etc.) may feel good on your throat.  If you were given a prescription for medicine here today, be sure to read all of the information (including the package insert) that comes with your prescription.  This will include important information about the medicine, its side effects, and any warnings that you need to know about.  The pharmacist who fills the prescription can provide more information and answer questions you may have about the medicine.  If you have questions or concerns that the pharmacist cannot address, please call or return to the Emergency Department.   Remember that you can always come back to the Emergency Department if you are not able to see your regular provider in the amount of time listed above, if you get any new symptoms, or if there is anything that worries you.      Peritonsillar Abscess    A peritonsillar abscess is a collection of pus that forms near the tonsils. It is a complication of a bacterial infection of the tonsils (tonsillitis). The abscess causes one or both tonsils to swell. The infection and swelling may spread to nearby tissues. If tissues swell enough to block the throat, the condition can become life-threatening. It is also dangerous if the abscess bursts and the infection spreads  or is breathed into the lungs. The goal is to treat a peritonsillar abscess before it worsens and threatens your health.  Signs and symptoms of peritonsillar abscess    Severe sore throat (often worse on one side)    Swollen and enlarged tonsils    Fever and chills    Pain when swallowing or trouble opening the mouth    Voice changes     Drooling    Swollen or tender glands in the neck  Diagnosing peritonsillar abscess  Your healthcare provider will examine you and look inside your mouth and throat. You will be asked about your symptoms and health history. Tests or procedures may be done as well, including those listed below.    Throat swab. This test checks for infection. It is done by wiping a sterile cotton swab in the back of the throat. The swab is then sent to a lab for study.    Blood tests. These might be done to check how your body is responding to the infection.    Ultrasound or computed tomography (CT) scans. These tests provide images of the abscess. They also help rule out other problems.    Needle aspiration. This procedure removes a sample of pus from the abscess with a needle. The sample is then sent to a lab to check for infection. In some cases, all of the pus is removed from the abscess.  Treating peritonsillar abscess  The abscess itself can be treated. Treatment of the underlying infection is also needed. Common treatments are listed below.    Medicines. Antibiotics are needed to treat the underlying infection. These may be taken by mouth or given by IV. Pain relievers may also be given, if needed.    Drainage of the abscess. A procedure may be needed to drain the pus from the abscess. Pus may be removed from the abscess with a needle (needle aspiration). Or, a small incision is made in the abscess. The pus is then drained and suctioned from the throat and mouth. This is called incision and drainage.    Tonsillectomy. This is surgery to remove the tonsils. It may be done if the abscess does not  improve with medicines. It may also be done if you have frequent tonsil infections or abscesses.  Recovery and follow-up  Treating the bacterial infection generally relieves the problem. Once the infection resolves, you should recover completely. Follow up with your healthcare provider as directed. And if you develop another throat infection, see your healthcare provider promptly.  Date Last Reviewed: 11/1/2016 2000-2017 [employer]. [address] [place] [address], [place] [address]. All rights reserved. This information is not intended as a substitute for professional medical care. Always follow your healthcare professional's instructions.            24 Hour Appointment Hotline       To make an appointment at any [facility] call [phone] ([phone]). If you don't have a family doctor or clinic, we will help you find one. [facility] clinics are conveniently located to serve the needs of you and your family.             Review of your medicines      START taking        Dose / Directions Last dose taken    clindamycin 300 MG capsule   Commonly known as:  CLEOCIN   Dose:  300 mg   Quantity:  42 capsule        Take 1 capsule (300 mg) by mouth 3 times daily for 14 days   Refills:  0          Our records show that you are taking the medicines listed below. If these are incorrect, please call your family doctor or clinic.        Dose / Directions Last dose taken    azithromycin 250 MG tablet   Commonly known as:  ZITHROMAX   Quantity:  6 tablet        Two tablets first day, then one tablet daily for four days.   Refills:  0        multivitamin, therapeutic with minerals Tabs tablet   Dose:  1 tablet        Take 1 tablet by mouth daily   Refills:  0        valACYclovir 500 MG tablet   Commonly known as:  VALTREX   Quantity:  6 tablet        Take one tab by mouth twice daily for 3 days. Start at first onset of symptoms   Refills:  6                Prescriptions were sent or printed at these locations  (1 Prescription)                   Other Prescriptions                Printed at Department/Unit printer (1 of 1)         clindamycin (CLEOCIN) 300 MG capsule                Procedures and tests performed during your visit     Basic metabolic panel    Beta strep group A culture    CBC with platelets differential    Rapid strep screen    Soft tissue neck CT w contrast    Wound Culture Aerobic Bacterial      Orders Needing Specimen Collection     None      Pending Results     Date and Time Order Name Status Description    1/28/2018 0012 Wound Culture Aerobic Bacterial In process     1/27/2018 2134 Beta strep group A culture In process             Pending Culture Results     Date and Time Order Name Status Description    1/28/2018 0012 Wound Culture Aerobic Bacterial In process     1/27/2018 2134 Beta strep group A culture In process             Pending Results Instructions     If you had any lab results that were not finalized at the time of your Discharge, you can call the ED Lab Result RN at [phone]. You will be contacted by this team for any positive Lab results or changes in treatment. The nurses are available 7 days a week from 10A to 6:30P.  You can leave a message 24 hours per day and they will return your call.        Test Results From Your Hospital Stay        1/27/2018  9:52 PM      Component Results     Component Value Ref Range & Units Status    WBC 11.5 (H) 4.0 - 11.0 10e9/L Final    RBC Count 3.23 (L) 3.8 - 5.2 10e12/L Final    Hemoglobin 10.4 (L) 11.7 - 15.7 g/dL Final    Hematocrit 31.3 (L) 35.0 - 47.0 % Final    MCV 97 78 - 100 fl Final    MCH 32.2 26.5 - 33.0 pg Final    MCHC 33.2 31.5 - 36.5 g/dL Final    RDW 13.1 10.0 - 15.0 % Final    Platelet Count 359 150 - 450 10e9/L Final    Diff Method Automated Method  Final    % Neutrophils 77.9 % Final    % Lymphocytes 8.4 % Final    % Monocytes 12.1 % Final    % Eosinophils 1.0 % Final    % Basophils 0.3 % Final    % Immature Granulocytes 0.3 % Final     Nucleated RBCs 0 0 /100 Final    Absolute Neutrophil 8.9 (H) 1.6 - 8.3 10e9/L Final    Absolute Lymphocytes 1.0 0.8 - 5.3 10e9/L Final    Absolute Monocytes 1.4 (H) 0.0 - 1.3 10e9/L Final    Absolute Eosinophils 0.1 0.0 - 0.7 10e9/L Final    Absolute Basophils 0.0 0.0 - 0.2 10e9/L Final    Abs Immature Granulocytes 0.0 0 - 0.4 10e9/L Final    Absolute Nucleated RBC 0.0  Final         1/27/2018 10:07 PM      Component Results     Component Value Ref Range & Units Status    Sodium 137 133 - 144 mmol/L Final    Potassium 4.0 3.4 - 5.3 mmol/L Final    Chloride 102 94 - 109 mmol/L Final    Carbon Dioxide 30 20 - 32 mmol/L Final    Anion Gap 5 3 - 14 mmol/L Final    Glucose 98 70 - 99 mg/dL Final    Urea Nitrogen 8 7 - 30 mg/dL Final    Creatinine 0.61 0.52 - 1.04 mg/dL Final    GFR Estimate >90 >60 mL/min/1.7m2 Final    Non  GFR Calc    GFR Estimate If Black >90 >60 mL/min/1.7m2 Final    African American GFR Calc    Calcium 8.4 (L) 8.5 - 10.1 mg/dL Final         1/27/2018 10:35 PM      Component Results     Component    Specimen Description    Throat    Rapid Strep A Screen    NEGATIVE: No Group A streptococcal antigen detected by immunoassay, await culture report.         1/27/2018 10:28 PM      Narrative     CT SCAN OF THE NECK WITH CONTRAST  1/27/2018 10:09 PM     HISTORY: Evaluate for peritonsillar abscess, right tonsil swelling,  ear pain bilateral.     TECHNIQUE:  Axial images and coronal reformations. 80 mL Isovue-370  IV. Radiation dose for this scan was reduced using automated exposure  control, adjustment of the mA and/or kV according to patient size, or  iterative reconstruction technique.    COMPARISON: None.    FINDINGS: There is a large developing right peritonsillar abscess  measuring 2.6 x 2.7 x 2.5 cm. This is causing moderate narrowing of  the oropharynx. There are also some adjacent surrounding edematous  changes in the right lateral pharynx. A few mildly prominent lymph  nodes  noted. Visualized orbits and paranasal sinuses are normal. The  salivary glands are normal. Vascular structures are unremarkable.  Previous cervical spine fusion procedure noted.        Impression     IMPRESSION: Developing right peritonsillar abscess measuring 2.6 x 2.7  x 2.5 cm.    ELISABETH PEREZ MD         1/28/2018 12:52 AM         1/28/2018 12:20 AM                Clinical Quality Measure: Blood Pressure Screening     Your blood pressure was checked while you were in the emergency department today. The last reading we obtained was  BP: 101/61 . Please read the guidelines below about what these numbers mean and what you should do about them.  If your systolic blood pressure (the top number) is less than 120 and your diastolic blood pressure (the bottom number) is less than 80, then your blood pressure is normal. There is nothing more that you need to do about it.  If your systolic blood pressure (the top number) is 120-139 or your diastolic blood pressure (the bottom number) is 80-89, your blood pressure may be higher than it should be. You should have your blood pressure rechecked within a year by a primary care provider.  If your systolic blood pressure (the top number) is 140 or greater or your diastolic blood pressure (the bottom number) is 90 or greater, you may have high blood pressure. High blood pressure is treatable, but if left untreated over time it can put you at risk for heart attack, stroke, or kidney failure. You should have your blood pressure rechecked by a primary care provider within the next 4 weeks.  If your provider in the emergency department today gave you specific instructions to follow-up with your doctor or provider even sooner than that, you should follow that instruction and not wait for up to 4 weeks for your follow-up visit.        Thank you for choosing [facility]       Thank you for choosing [facility] for your care. Our goal is always to provide you with excellent care. Hearing back  "from our patients is one way we can continue to improve our services. Please take a few minutes to complete the written survey that you may receive in the mail after you visit with us. Thank you!        [employer] Information     [employer] lets you send messages to your doctor, view your test results, renew your prescriptions, schedule appointments and more. To sign up, go to [email or web address] . Click on \"Log in\" on the left side of the screen, which will take you to the Welcome page. Then click on \"Sign up Now\" on the right side of the page.     You will be asked to enter the access code listed below, as well as some personal information. Please follow the directions to create your username and password.     Your access code is: [email or web address]  Expires: 2018  3:02 PM     Your access code will  in 90 days. If you need help or a new code, please call your [facility] clinic or [phone].        Care EveryWhere ID     This is your Care EveryWhere ID. This could be used by other organizations to access your [facility] medical records  [id number]        Equal Access to Services     NELDA DYE : Hadisidoro Lemus, ghazala liu, nagi guy, odalis guzman . So [facility] [phone].    ATENCIÓN: Si habla español, tiene a brush disposición servicios gratuitos de asistencia lingüística. Trevor al [phone].    We comply with applicable federal civil rights laws and [place] laws. We do not discriminate on the basis of race, color, national origin, age, disability, sex, sexual orientation, or gender identity.            After Visit Summary       This is your record. Keep this with you and show to your community pharmacist(s) and doctor(s) at your next visit.                  "

## 2018-01-28 VITALS
HEIGHT: 64 IN | OXYGEN SATURATION: 99 % | TEMPERATURE: 98.1 F | SYSTOLIC BLOOD PRESSURE: 99 MMHG | RESPIRATION RATE: 18 BRPM | WEIGHT: 120 LBS | DIASTOLIC BLOOD PRESSURE: 68 MMHG | HEART RATE: 108 BPM | BODY MASS INDEX: 20.49 KG/M2

## 2018-01-28 PROCEDURE — 87147 CULTURE TYPE IMMUNOLOGIC: CPT | Performed by: EMERGENCY MEDICINE

## 2018-01-28 PROCEDURE — 87070 CULTURE OTHR SPECIMN AEROBIC: CPT | Performed by: EMERGENCY MEDICINE

## 2018-01-28 PROCEDURE — 25000132 ZZH RX MED GY IP 250 OP 250 PS 637: Performed by: EMERGENCY MEDICINE

## 2018-01-28 RX ORDER — CLINDAMYCIN HCL 300 MG
300 CAPSULE ORAL 3 TIMES DAILY
Qty: 42 CAPSULE | Refills: 0 | Status: SHIPPED | OUTPATIENT
Start: 2018-01-28 | End: 2018-02-11

## 2018-01-28 RX ORDER — CLINDAMYCIN HCL 300 MG
300 CAPSULE ORAL ONCE
Status: COMPLETED | OUTPATIENT
Start: 2018-01-28 | End: 2018-01-28

## 2018-01-28 RX ADMIN — CLINDAMYCIN HYDROCHLORIDE 300 MG: 300 CAPSULE ORAL at 00:32

## 2018-01-28 RX ADMIN — DEXAMETHASONE INTENSOL 10 MG: 1 SOLUTION, CONCENTRATE ORAL at 00:36

## 2018-01-28 NOTE — DISCHARGE INSTRUCTIONS
Please return to the ED if notice worsening pain, muffled voice, drooling, difficulty swallowing or opening your mouth, fevers >101 or other acute changes.  Please use tylenol and ibuprofen for pain.  Drink plenty of fluids and rest.      Discharge Instructions  Sore Throat  You were seen today for a sore throat.  Most (>80%) sore throats are caused by a virus. Antibiotics do not help with viral infections, but you can fight off the virus on your own.  In this case, your sore throat would be treated with medications for your pain and fever.    Strep throat is a kind of sore throat caused by Group A streptococcus bacteria.  This type of sore throat is treated with antibiotics.  If you had a rapid test done today for strep throat and it did not show infection, we may do a culture. If the culture shows you have strep throat, we will call you and get you a prescription for antibiotics.  Generally, every Emergency Department visit should have a follow-up clinic visit with either a primary or a specialty clinic/provider. Please follow-up as instructed by your emergency provider today.  Return to the Emergency Department if:    If you have difficulty breathing.    If you are drooling because you are unable to swallow.    You become dehydrated due to difficulty drinking. Signs of dehydration include weakness, dry mouth, and urinating less than 3 times per day.    If you develop swelling of the neck or tongue.    If you develop a high fever with either severe or unusual headache or stiff neck.    Treatment:      Pain relief -- Non-prescription pain medications, such as Tylenol  (acetaminophen) or Motrin , Advil  (ibuprofen) are usually recommended for pain.  Do not use a medicine that you are allergic to, or if your provider has told you not to use it.    Soft or liquid diet. Concentrate on liquids to keep yourself hydrated. Cold liquids (popsicles, ice cream, etc.) may feel good on your throat.  If you were given a  prescription for medicine here today, be sure to read all of the information (including the package insert) that comes with your prescription.  This will include important information about the medicine, its side effects, and any warnings that you need to know about.  The pharmacist who fills the prescription can provide more information and answer questions you may have about the medicine.  If you have questions or concerns that the pharmacist cannot address, please call or return to the Emergency Department.   Remember that you can always come back to the Emergency Department if you are not able to see your regular provider in the amount of time listed above, if you get any new symptoms, or if there is anything that worries you.      Peritonsillar Abscess    A peritonsillar abscess is a collection of pus that forms near the tonsils. It is a complication of a bacterial infection of the tonsils (tonsillitis). The abscess causes one or both tonsils to swell. The infection and swelling may spread to nearby tissues. If tissues swell enough to block the throat, the condition can become life-threatening. It is also dangerous if the abscess bursts and the infection spreads or is breathed into the lungs. The goal is to treat a peritonsillar abscess before it worsens and threatens your health.  Signs and symptoms of peritonsillar abscess    Severe sore throat (often worse on one side)    Swollen and enlarged tonsils    Fever and chills    Pain when swallowing or trouble opening the mouth    Voice changes     Drooling    Swollen or tender glands in the neck  Diagnosing peritonsillar abscess  Your healthcare provider will examine you and look inside your mouth and throat. You will be asked about your symptoms and health history. Tests or procedures may be done as well, including those listed below.    Throat swab. This test checks for infection. It is done by wiping a sterile cotton swab in the back of the throat. The swab is  then sent to a lab for study.    Blood tests. These might be done to check how your body is responding to the infection.    Ultrasound or computed tomography (CT) scans. These tests provide images of the abscess. They also help rule out other problems.    Needle aspiration. This procedure removes a sample of pus from the abscess with a needle. The sample is then sent to a lab to check for infection. In some cases, all of the pus is removed from the abscess.  Treating peritonsillar abscess  The abscess itself can be treated. Treatment of the underlying infection is also needed. Common treatments are listed below.    Medicines. Antibiotics are needed to treat the underlying infection. These may be taken by mouth or given by IV. Pain relievers may also be given, if needed.    Drainage of the abscess. A procedure may be needed to drain the pus from the abscess. Pus may be removed from the abscess with a needle (needle aspiration). Or, a small incision is made in the abscess. The pus is then drained and suctioned from the throat and mouth. This is called incision and drainage.    Tonsillectomy. This is surgery to remove the tonsils. It may be done if the abscess does not improve with medicines. It may also be done if you have frequent tonsil infections or abscesses.  Recovery and follow-up  Treating the bacterial infection generally relieves the problem. Once the infection resolves, you should recover completely. Follow up with your healthcare provider as directed. And if you develop another throat infection, see your healthcare provider promptly.  Date Last Reviewed: 11/1/2016 2000-2017 [employer]. [address] [place] [address], [place] [address]. All rights reserved. This information is not intended as a substitute for professional medical care. Always follow your healthcare professional's instructions.

## 2018-01-28 NOTE — ED PROVIDER NOTES
"  History     Chief Complaint:  Allergic Reaction    HPI   Shaheen Flaherty is a 42 year old female who presents with her parents for evaluation of an allergic reaction. Patient reports that she was diagnosed with strep throat on Wednesday, 3 days ago, and was prescribed Azithromycin which she had been taking. Patient also reports that prior to this, she had been diagnosed with otitis media on 1/5/18 and thought that otitis drops from the otitis media infection might have dropped to the back of her throat causing this infection.     Patient had been taking Azithromycin for these complications but now presents to the ED due to swelling of her throat and a mild headache. She suspects this to be an allergic reaction to the Zithromax as she is already allergic to PCNs. She adds that in addition to the swelling in her throat, she has also experienced difficulty eating hard foods, reports a mild headache and reports that she is experiencing such difficulty swallowing even her own spit. She denies nausea, vomiting, significant tongue swelling, ear pain, and reports that no one else is sick at home. Of note: Patient took her last dose of Zithromax at 1700 today and took a tylenol 800mg at 1800 for symptomatic treatment of her sore throat symptoms.     Allergies:  Penicillins      Medications:    Zithromax  Valtrex    Past Medical History:    Spinal stenosis  Genital herpes  Condyloma  Cervical disc herniation     Past Surgical History:    ENT Surgery  fUSION CERVICAL ANTERIOR     Family History:    No pertinent hx    Social History:  Smoking status: No  Alcohol use: Yes   Marital Status:  Single [1]     Review of Systems   HENT: Positive for sore throat and trouble swallowing.    Gastrointestinal: Negative for nausea and vomiting.   All other systems reviewed and are negative.      Physical Exam   First Vitals:  BP: 101/61  Pulse: 108  Temp: 98.1  F (36.7  C)  Resp: 18  Height: 162.6 cm (5' 4\")  Weight: 54.4 kg (120 " lb)  SpO2: 99 %      Physical Exam  Physical Exam   General: Resting on the bed.  Head: No obvious trauma to head.  Ears, Nose, Throat:  External ears normal.  Nose normal.  Significant right tonsillar erythema and swelling with midline shifted to the left.  Trismus noted.  Soft floor the mouth.  No exudates.  Eyes:  Conjunctivae clear.  Pupils are equal, round, and reactive.   Neck: Normal range of motion.  Neck supple.   CV: Regular rate and rhythm.  No murmurs.      Respiratory: Effort normal and breath sounds normal.  No wheezing or crackles.   Gastrointestinal: Soft.  No distension. There is no tenderness.    Skin: Skin is warm and dry.  No rash noted.     Emergency Department Course   Imaging:  Soft tissue neck CT w contrast   Final Result   IMPRESSION: Developing right peritonsillar abscess measuring 2.6 x 2.7   x 2.5 cm.      ELISABETH PEREZ MD            Laboratory:  CBC: WBC 11.5 (H) RBC 3.23L, HGB 10.4L, HCT 31.3L, Neutrophils 8.9 (H) Monocytes 1.4 (H)   BMP: WNL (Creat 0.61)  RS: Negative    Interventions:  2052: Benadryl  2133: NS 1L IV Bolus   2133: Toradol, 15 mg, IV injection      Procedure: Incision and Drainage   LOCATIONS:  Right PTA     ANESTHESIA:  Local field block using Lidocaine 1% plain and hurricaine spray, total of 1 mLs     PREPARATION:  none     PROCEDURE:  Area was incised with 18 G needle.  Wound treatment included Purulent Drainage.  Packing consisted of No Packing.  approximately 5 cc of purulent drainage     Patient Status:        Patient tolerated the procedure well. There were no complications.            Emergency Department Course:  Past medical records, nursing notes, and vitals reviewed.  2101: I performed an exam of the patient and obtained history, as documented above.       IV inserted and blood drawn for the above work up to be conducted.       Findings and plan explained to the Patient. Patient discharged home with instructions regarding supportive care, medications, and  reasons to return. The importance of close follow-up was reviewed.      Impression & Plan    Medical Decision Making:  Shaheen Flaherty is a 42 year old female who presents for evaluation of a sore throat and clinical evidence of peritonsillar abscess.  Initially mildly tachycardic.  Strep test negative.  CBC without significant leukocytosis and only mild leukocytosis which is consistent with peritonsillar abscess.  BMP without any acute electrolyte metabolic or renal abnormality.  CT soft tissue of the area reveals obvious peritonsillar abscess which was drained successfully by needle aspiration.  There is no clinical evidence of retropharyngeal abscess, Lemierre's Syndrome, epiglottis, or James's angina.   I have recommended further outpatient treatment with analgesics and antibiotics.  Given her significant swelling did give a dose of Decadron.  She is able to tolerate p.o. in the emergency department.  Her vital signs improved at this time not concerning for severe sepsis or septic shock.  Return if increasing pain, change in voice, cannot swallow, fever, or shortness of breath. Follow-up with ENT for recheck 1-2 days.  She voices understanding the plan and feels this is appropriate.  She will be going home with her mother tonight since she will have close observation.  She is discharged in improved condition.  Diagnosis:    ICD-10-CM    1. Peritonsillar abscess J36 Wound Culture Aerobic Bacterial   2. Throat pain R07.0        Disposition:  discharged to home    Discharge Medications:  New Prescriptions    CLINDAMYCIN (CLEOCIN) 300 MG CAPSULE    Take 1 capsule (300 mg) by mouth 3 times daily for 14 days     Guillermina AVELAR am serving as a scribe at 9:01 PM on 1/27/2018 to document services personally performed by Lesia Dejesus MD based on my observations and the provider's statements to me.      EMERGENCY DEPARTMENT       Lesia Dejesus MD  01/28/18 0132

## 2018-01-28 NOTE — ED NOTES
Called pt for re-evaluation pt states can barely speak and not able to swallow, attempted benadryl liquid pt unable to swallow.

## 2018-01-29 NOTE — TELEPHONE ENCOUNTER
FS,  FYI:  Patient went to ER 1/27/2018  Was started on a different abx (Clindamycin)  Latia BECERRIL RN

## 2018-01-30 ENCOUNTER — TELEPHONE (OUTPATIENT)
Dept: EMERGENCY MEDICINE | Facility: CLINIC | Age: 43
End: 2018-01-30

## 2018-01-30 LAB
BACTERIA SPEC CULT: ABNORMAL
BACTERIA SPEC CULT: NORMAL
Lab: ABNORMAL
SPECIMEN SOURCE: ABNORMAL
SPECIMEN SOURCE: NORMAL

## 2018-01-30 NOTE — TELEPHONE ENCOUNTER
[facility] [place] Emergency Department Lab result notification:    [place] ED lab result protocol used  General Culture Protocol - Abscess    Reason for call  Notify of lab results, assess symptoms,  review ED providers recommendations/discharge instructions (if necessary) and advise per ED lab result f/u protocol    Lab Result  Final Wound culture report on 01/30/2018  [place] ED discharge antibiotic: Clindamycin (Cleocin) 300 mg PO capsule, Take 1 capsule (300 mg) by mouth 3 times daily for 14 days  #1. Bacteria, Moderate growth Beta hemolytic Streptococcus group A, which is NOT TESTED to ED discharge antibiotic  Incision and Drainage performed in [facility] ED [Yes / No]: Yes  Patient to be notified of result, symptoms's assessed and advised per [place] ED lab result protocol.  Information table from ED Provider visit on 01/27/2018  Symptoms reported at ED visit (Chief complaint, HPI) Patient had been taking Azithromycin for these complications but now presents to the ED due to swelling of her throat and a mild headache. She suspects this to be an allergic reaction to the Zithromax as she is already allergic to PCNs. She adds that in addition to the swelling in her throat, she has also experienced difficulty eating hard foods, reports a mild headache and reports that she is experiencing such difficulty swallowing even her own spit. She denies nausea, vomiting, significant tongue swelling, ear pain, and reports that no one else is sick at home. Of note: Patient took her last dose of Zithromax at 1700 today and took a tylenol 800mg at 1800 for symptomatic treatment of her sore throat symptoms.    ED providers Impression and Plan (applicable information) 42 year old female who presents for evaluation of a sore throat and clinical evidence of peritonsillar abscess.  Initially mildly tachycardic.  Strep test negative.  CBC without significant leukocytosis and only mild leukocytosis which is consistent with  peritonsillar abscess.  BMP without any acute electrolyte metabolic or renal abnormality.  CT soft tissue of the area reveals obvious peritonsillar abscess which was drained successfully by needle aspiration.  There is no clinical evidence of retropharyngeal abscess, Lemierre's Syndrome, epiglottis, or James's angina.   I have recommended further outpatient treatment with analgesics and antibiotics.  Given her significant swelling did give a dose of Decadron.  She is able to tolerate p.o. in the emergency department.  Her vital signs improved at this time not concerning for severe sepsis or septic shock.  Return if increasing pain, change in voice, cannot swallow, fever, or shortness of breath. Follow-up with ENT for recheck 1-2 days.  She voices understanding the plan and feels this is appropriate.  She will be going home with her mother tonight since she will have close observation.  She is discharged in improved condition.     RN Assessment (Patient s current Symptoms), include time called.  [Insert Left message here if message left]  At 1557 Left voicemail message requesting a call back to [phone] between 10 a.m. and 6:30 p.m., 7 days a week for patient's ED/UC lab results.  May leave a message 24/7, if no one available.     Marybel Terry, RN  [facility] [employer] RN  Lung Nodule and ED Lab Result F/u RN  Epic pool (ED late result f/u RN): [id number]  FV INCIDENTAL RADIOLOGY F/U NURSES: P [id number]  # [phone]      Copy of Lab result   Exam Information   Exam Date Exam Time Accession # Results    1/28/18 12:05 AM [id number]    Component Results   Component Collected Lab   Specimen Description 01/28/2018 12:05    Throat   Special Requests 01/28/2018 12:05 AM 75   Specimen collected in eSwab transport (white cap)   Culture Micro (Abnormal) 01/28/2018 12:05    Moderate growth   Beta hemolytic Streptococcus group A   Susceptibility testing not routinely done

## 2018-01-31 NOTE — TELEPHONE ENCOUNTER
"Shaheen notified of Culture result.  She was advised to relay this information to her PCP as she was seen by her PCP yesterday d/t the abscess returning.  Advised to continue the Clindamycin.  (She is taking currently)  Reports symptoms are much better.  \"I can tell it feels much better today.\"    Contact your PCP clinic or return to the Emergency department if your:    Symptoms return.    Symptoms do not resolve after completing antibiotic.    Symptoms worsen or other concerning symptom's.    Constantin Garrett RN  [facility] RN  Lung Nodule and ED Lab Result F/u RN  Epic pool (ED late result f/u RN): [id number]  FV INCIDENTAL RADIOLOGY F/U NURSES: P [id number]  # [phone]    "

## 2018-03-06 ENCOUNTER — OFFICE VISIT (OUTPATIENT)
Dept: FAMILY MEDICINE | Facility: CLINIC | Age: 43
End: 2018-03-06
Payer: COMMERCIAL

## 2018-03-06 VITALS
OXYGEN SATURATION: 99 % | TEMPERATURE: 98.7 F | DIASTOLIC BLOOD PRESSURE: 64 MMHG | HEIGHT: 64 IN | BODY MASS INDEX: 20.14 KG/M2 | SYSTOLIC BLOOD PRESSURE: 100 MMHG | HEART RATE: 77 BPM | WEIGHT: 118 LBS | RESPIRATION RATE: 14 BRPM

## 2018-03-06 DIAGNOSIS — G47.20 SLEEP STAGE DYSFUNCTION: ICD-10-CM

## 2018-03-06 DIAGNOSIS — Z00.00 ROUTINE GENERAL MEDICAL EXAMINATION AT A HEALTH CARE FACILITY: Primary | ICD-10-CM

## 2018-03-06 DIAGNOSIS — D50.8 OTHER IRON DEFICIENCY ANEMIA: ICD-10-CM

## 2018-03-06 DIAGNOSIS — Z83.49 FAMILY HISTORY OF THYROID DISEASE: ICD-10-CM

## 2018-03-06 DIAGNOSIS — M48.02 SPINAL STENOSIS IN CERVICAL REGION: ICD-10-CM

## 2018-03-06 DIAGNOSIS — N92.6 MISSED PERIODS: ICD-10-CM

## 2018-03-06 DIAGNOSIS — Z12.4 SCREENING FOR MALIGNANT NEOPLASM OF CERVIX: ICD-10-CM

## 2018-03-06 LAB
RETICS # AUTO: 71 10E9/L (ref 25–95)
RETICS/RBC NFR AUTO: 2 % (ref 0.5–2)

## 2018-03-06 PROCEDURE — 99396 PREV VISIT EST AGE 40-64: CPT | Performed by: FAMILY MEDICINE

## 2018-03-06 PROCEDURE — 85060 BLOOD SMEAR INTERPRETATION: CPT | Performed by: FAMILY MEDICINE

## 2018-03-06 PROCEDURE — 84443 ASSAY THYROID STIM HORMONE: CPT | Performed by: FAMILY MEDICINE

## 2018-03-06 PROCEDURE — 85025 COMPLETE CBC W/AUTO DIFF WBC: CPT | Performed by: FAMILY MEDICINE

## 2018-03-06 PROCEDURE — G0145 SCR C/V CYTO,THINLAYER,RESCR: HCPCS | Performed by: FAMILY MEDICINE

## 2018-03-06 PROCEDURE — 36415 COLL VENOUS BLD VENIPUNCTURE: CPT | Performed by: FAMILY MEDICINE

## 2018-03-06 PROCEDURE — 82728 ASSAY OF FERRITIN: CPT | Performed by: FAMILY MEDICINE

## 2018-03-06 PROCEDURE — 87624 HPV HI-RISK TYP POOLED RSLT: CPT | Performed by: FAMILY MEDICINE

## 2018-03-06 PROCEDURE — 85045 AUTOMATED RETICULOCYTE COUNT: CPT | Performed by: FAMILY MEDICINE

## 2018-03-06 NOTE — MR AVS SNAPSHOT
After Visit Summary   3/6/2018    Shaheen Flaherty    MRN: [id number]           Patient Information     Date Of Birth          1975        Visit Information        Provider Department      3/6/2018 12:00 PM Elaina Douglas MD [facility]        Today's Diagnoses     Routine general medical examination at a health care facility    -  1    Family history of thyroid disease        Missed periods        Screening for malignant neoplasm of cervix        Sleep stage dysfunction        Other iron deficiency anemia        Spinal stenosis in cervical region          Care Instructions      Preventive Health Recommendations  Female Ages 40 to 49    Yearly exam:     See your health care provider every year in order to  1. Review health changes.   2. Discuss preventive care.    3. Review your medicines if your doctor prescribed any.      Get a Pap test every three years (unless you have an abnormal result and your provider advises testing more often).      If you get Pap tests with HPV test, you only need to test every 5 years, unless you have an abnormal result. You do not need a Pap test if your uterus was removed (hysterectomy) and you have not had cancer.      You should be tested each year for STDs (sexually transmitted diseases), if you're at risk.       Ask your doctor if you should have a mammogram.      Have a colonoscopy (test for colon cancer) if someone in your family has had colon cancer or polyps before age 50.       Have a cholesterol test every 5 years.       Have a diabetes test (fasting glucose) after age 45. If you are at risk for diabetes, you should have this test every 3 years.    Shots: Get a flu shot each year. Get a tetanus shot every 10 years.     Nutrition:     Eat at least 5 servings of fruits and vegetables each day.    Eat whole-grain bread, whole-wheat pasta and brown rice instead of white grains and rice.    Talk to your provider about Calcium and Vitamin D.  "    Lifestyle    Exercise at least 150 minutes a week (an average of 30 minutes a day, 5 days a week). This will help you control your weight and prevent disease.    Limit alcohol to one drink per day.    No smoking.     Wear sunscreen to prevent skin cancer.    See your dentist every six months for an exam and cleaning.          Follow-ups after your visit        Who to contact     If you have questions or need follow up information about today's clinic visit or your schedule please contact [facility] directly at [phone].  Normal or non-critical lab and imaging results will be communicated to you by [employer], letter or phone within 4 business days after the clinic has received the results. If you do not hear from us within 7 days, please contact the clinic through [employer] or phone. If you have a critical or abnormal lab result, we will notify you by phone as soon as possible.  Submit refill requests through [employer] or call your pharmacy and they will forward the refill request to us. Please allow 3 business days for your refill to be completed.          Additional Information About Your Visit        [employer] Information     [employer] lets you send messages to your doctor, view your test results, renew your prescriptions, schedule appointments and more. To sign up, go to [email or web address] . Click on \"Log in\" on the left side of the screen, which will take you to the Welcome page. Then click on \"Sign up Now\" on the right side of the page.     You will be asked to enter the access code listed below, as well as some personal information. Please follow the directions to create your username and password.     Your access code is: [email or web address]  Expires: 2018  3:02 PM     Your access code will  in 90 days. If you need help or a new code, please call your [facility] or [phone].        Care EveryWhere ID     This is your Care EveryWhere ID. This could be used by other organizations to " "access your [facility] medical records  [id number]        Your Vitals Were     Pulse Temperature Respirations Height Last Period Pulse Oximetry    77 98.7  F (37.1  C) (Tympanic) 14 5' 4\" (1.626 m) 01/28/2018 99%    Breastfeeding? BMI (Body Mass Index)                No 20.25 kg/m2           Blood Pressure from Last 3 Encounters:   03/06/18 100/64   01/28/18 99/68   01/25/18 96/62    Weight from Last 3 Encounters:   03/06/18 118 lb (53.5 kg)   01/27/18 120 lb (54.4 kg)   01/25/18 119 lb 1.6 oz (54 kg)              We Performed the Following     Blood Morphology Pathologist Review     CBC with platelets differential     Ferritin     HPV High Risk Types DNA Cervical     Pap imaged thin layer screen with HPV - recommended age 30 - 65 years (select HPV order below)     Reticulocyte Count     TSH with free T4 reflex        Primary Care Provider Office Phone # Fax #    [facility] [phone] [address]  [facility] [address]        Equal Access to Services     NELDA DYE : Hadii randy ratliff hadasho Soleno, waaxda luqadaha, qaybta kaalmada malena, odalis guzman . So [facility] [phone].    ATENCIÓN: Si habla español, tiene a brush disposición servicios gratuitos de asistencia lingüística. Trevor al [phone].    We comply with applicable federal civil rights laws and [place] laws. We do not discriminate on the basis of race, color, national origin, age, disability, sex, sexual orientation, or gender identity.            Thank you!     Thank you for choosing [facility]  for your care. Our goal is always to provide you with excellent care. Hearing back from our patients is one way we can continue to improve our services. Please take a few minutes to complete the written survey that you may receive in the mail after your visit with us. Thank you!             Your Updated Medication List - Protect others around you: Learn how to safely use, " store and throw away your medicines at www.disposemymeds.org.          This list is accurate as of 3/6/18  1:09 PM.  Always use your most recent med list.                   Brand Name Dispense Instructions for use Diagnosis    multivitamin, therapeutic with minerals Tabs tablet      Take 1 tablet by mouth daily        valACYclovir 500 MG tablet    VALTREX    6 tablet    Take one tab by mouth twice daily for 3 days. Start at first onset of symptoms    HSV infection

## 2018-03-06 NOTE — LETTER
March 14, 2018    Shaheen Flaherty  [address]   [facility] [address]    Dear Shaheen,  We are happy to inform you that your PAP smear result from 03/06/18 is normal.  We are now able to do a follow up test on PAP smears. The DNA test is for HPV (Human Papilloma Virus). Cervical cancer is closely linked with certain types of HPV. Your result showed no evidence of high risk HPV.  Therefore we recommend you return in 5 years for your next pap smear and HPV test.  You will still need to return to the clinic every year for an annual exam and other preventive tests.  Please contact the clinic at [phone] with any questions.  Sincerely,    Elaina Douglas MD/darryn

## 2018-03-06 NOTE — PROGRESS NOTES
SUBJECTIVE:   CC: Shaheen Flaherty is an 42 year old woman who presents for preventive health visit.   She would  like thyroid check, wondering if thyroid off and causing sleep problems at night  Having difficulty staying asleep between 2-4 am- wakes up to go to the bathroom only at night  Not having burning in urine at al   Works in pm- so waking up late   No naps druing the day  Has taken tylenol pm only as needed  - tries not to un;less desparate  Regular periods- except this month- over due by a week  No sexual activity for 6 month- resumed only 2 days ago  Not worried about pregnancy    Healthy Habits:    Do you get at least three servings of calcium containing foods daily (dairy, green leafy vegetables, etc.)? yes    Amount of exercise or daily activities, outside of work: 2-3 day(s) per week    Problems taking medications regularly No    Medication side effects: No    Have you had an eye exam in the past two years? no    Do you see a dentist twice per year? yes    Do you have sleep apnea, excessive snoring or daytime drowsiness?no      PROBLEMS TO ADD ON...    Today's PHQ-2 Score:   PHQ-2 ( 1999 [employer]) 3/6/2018 11/5/2015   Q1: Little interest or pleasure in doing things - 0   Q2: Feeling down, depressed or hopeless - 0   PHQ-2 Score - 0   PHQ-2 Score Incomplete -       Abuse: Current or Past(Physical, Sexual or Emotional)- No  Do you feel safe in your environment - Yes    Social History   Substance Use Topics     Smoking status: Never Smoker     Smokeless tobacco: Never Used     Alcohol use 0.0 oz/week     0 Standard drinks or equivalent per week      Comment: 2 wine daily     If you drink alcohol do you typically have >3 drinks per day or >7 drinks per week? No                     Reviewed orders with patient.  Reviewed health maintenance and updated orders accordingly - Yes  Labs reviewed in EPIC    Mammogram everyother year    Pertinent mammograms are reviewed under the imaging tab.  History of  "abnormal Pap smear: NO - age 30-65 PAP every 5 years with negative HPV co-testing recommended    Reviewed and updated as needed this visit by clinical staff  Tobacco  Allergies  Meds  Med Hx  Surg Hx  Fam Hx  Soc Hx        Reviewed and updated as needed this visit by Provider            ROS:  C: NEGATIVE for fever, chills, change in weight  I: NEGATIVE for worrisome rashes, moles or lesions  E: NEGATIVE for vision changes or irritation  ENT: NEGATIVE for ear, mouth and throat problems  R: NEGATIVE for significant cough or SOB  B: NEGATIVE for masses, tenderness or discharge  CV: NEGATIVE for chest pain, palpitations or peripheral edema  GI: NEGATIVE for nausea, abdominal pain, heartburn, or change in bowel habits  : NEGATIVE for unusual urinary or vaginal symptoms. Periods are regular.  M: NEGATIVE for significant arthralgias or myalgia  N: NEGATIVE for weakness, dizziness or paresthesias  P: NEGATIVE for changes in mood or affect    OBJECTIVE:   /64  Pulse 77  Temp 98.7  F (37.1  C) (Tympanic)  Resp 14  Ht 5' 4\" (1.626 m)  Wt 118 lb (53.5 kg)  LMP 01/28/2018  SpO2 99%  Breastfeeding? No  BMI 20.25 kg/m2  EXAM:  GENERAL: healthy, alert and no distress  EYES: Eyes grossly normal to inspection, PERRL and conjunctivae and sclerae normal  HENT: ear canals and TM's normal, nose and mouth without ulcers or lesions  NECK: no adenopathy, no asymmetry, masses, or scars and thyroid normal to palpation  RESP: lungs clear to auscultation - no rales, rhonchi or wheezes  BREAST: normal without masses, tenderness or nipple discharge and no palpable axillary masses or adenopathy  CV: regular rate and rhythm, normal S1 S2, no S3 or S4, no murmur, click or rub, no peripheral edema and peripheral pulses strong  ABDOMEN: soft, nontender, no hepatosplenomegaly, no masses and bowel sounds normal   (female): normal female external genitalia, normal urethral meatus, vaginal mucosa pink, moist, well rugated, and " normal cervix/adnexa/uterus without masses or discharge  MS: no gross musculoskeletal defects noted, no edema  SKIN: no suspicious lesions or rashes  NEURO: Normal strength and tone, mentation intact and speech normal  PSYCH: mentation appears normal, affect normal/bright    ASSESSMENT/PLAN:   (Z00.00) Routine general medical examination at a health care facility  (primary encounter diagnosis)  Comment: Plan: Please see patient instructions     (Z83.49) Family history of thyroid disease  Comment: mom  Plan: TSH with free T4 reflex            (N92.6) Missed periods  Comment: Plan: advised home urine pregnancy test. Patient declined urine pregnancy test in the clinic  Menstrual calender to be maintained, marking first day of ever period and it should fall within normal limits of 21-35 days from the first day of normal menstrual blood flow        (Z12.4) Screening for malignant neoplasm of cervix  Comment: Plan: Pap imaged thin layer screen with HPV -         recommended age 30 - 65 years (select HPV order        below), HPV High Risk Types DNA Cervical            (G47.20) Sleep stage dysfunction  Comment: Plan: Sleep hygiene- discussed in detail   Advised to Wake up same time daily  Stay active during the day, avoid naps and caffeine  Keep bedroom dark, quite and cool,   and no screen time for at least 30 mins prior to bed time  Leave bed if unable to sleep, may try to read something outside bedroom and return when sleepy            (D50.8) Other iron deficiency anemia  Comment: Plan: reports has been taking iron tabs daily for a month  Will recheck and investigate if iron deficient  If low in ferritin- advised to take over the counter everyother day for 1 month and repeat- otherwise stop iron for now  Ferritin, Blood Morphology Pathologist Review,         CBC with platelets differential, Reticulocyte         Count              COUNSELING:   Reviewed preventive health counseling, as reflected in patient instructions    "    Regular exercise       Healthy diet/nutrition         reports that she has never smoked. She has never used smokeless tobacco.    Estimated body mass index is 20.25 kg/(m^2) as calculated from the following:    Height as of this encounter: 5' 4\" (1.626 m).    Weight as of this encounter: 118 lb (53.5 kg).       Counseling Resources:  ATP IV Guidelines  Pooled Cohorts Equation Calculator  Breast Cancer Risk Calculator  FRAX Risk Assessment  ICSI Preventive Guidelines  Dietary Guidelines for Americans, 2010  [employer]'s MyPlate  ASA Prophylaxis  Lung CA Screening    Elaina Douglas MD  [facility] CLINICAnswers for HPI/ROS submitted by the patient on 3/6/2018   Annual Exam:  Getting at least 3 servings of Calcium per day:: Yes  Bi-annual eye exam:: NO  Dental care twice a year:: Yes  Sleep apnea or symptoms of sleep apnea:: None  Diet:: Regular (no restrictions)  PHQ-2 Score: Incomplete    "

## 2018-03-07 LAB
FERRITIN SERPL-MCNC: 24 NG/ML (ref 12–150)
TSH SERPL DL<=0.005 MIU/L-ACNC: 1.46 MU/L (ref 0.4–4)

## 2018-03-08 LAB
BASOPHILS # BLD AUTO: 0.2 10E9/L (ref 0–0.2)
BASOPHILS NFR BLD AUTO: 2 %
COPATH REPORT: NORMAL
COPATH REPORT: NORMAL
DIFFERENTIAL METHOD BLD: ABNORMAL
EOSINOPHIL # BLD AUTO: 0.1 10E9/L (ref 0–0.7)
EOSINOPHIL NFR BLD AUTO: 1 %
ERYTHROCYTE [DISTWIDTH] IN BLOOD BY AUTOMATED COUNT: 15 % (ref 10–15)
HCT VFR BLD AUTO: 35.2 % (ref 35–47)
HGB BLD-MCNC: 11.5 G/DL (ref 11.7–15.7)
LYMPHOCYTES # BLD AUTO: 1.5 10E9/L (ref 0.8–5.3)
LYMPHOCYTES NFR BLD AUTO: 15 %
MCH RBC QN AUTO: 32.6 PG (ref 26.5–33)
MCHC RBC AUTO-ENTMCNC: 32.7 G/DL (ref 31.5–36.5)
MCV RBC AUTO: 100 FL (ref 78–100)
MONOCYTES # BLD AUTO: 0.6 10E9/L (ref 0–1.3)
MONOCYTES NFR BLD AUTO: 6 %
NEUTROPHILS # BLD AUTO: 7.4 10E9/L (ref 1.6–8.3)
NEUTROPHILS NFR BLD AUTO: 76 %
PAP: NORMAL
PLATELET # BLD AUTO: 343 10E9/L (ref 150–450)
RBC # BLD AUTO: 3.53 10E12/L (ref 3.8–5.2)
WBC # BLD AUTO: 9.8 10E9/L (ref 4–11)

## 2018-03-12 LAB
FINAL DIAGNOSIS: NORMAL
HPV HR 12 DNA CVX QL NAA+PROBE: NEGATIVE
HPV16 DNA SPEC QL NAA+PROBE: NEGATIVE
HPV18 DNA SPEC QL NAA+PROBE: NEGATIVE
SPECIMEN DESCRIPTION: NORMAL
SPECIMEN SOURCE CVX/VAG CYTO: NORMAL

## 2018-04-24 ENCOUNTER — TELEPHONE (OUTPATIENT)
Dept: OTHER | Facility: CLINIC | Age: 43
End: 2018-04-24

## 2018-04-24 NOTE — TELEPHONE ENCOUNTER
4/24/2018    Call Regarding Onboarding [facility] choices     Attempt 1    Message on voicemail    Comments:       Outreach   Ally Shah

## 2018-05-22 NOTE — TELEPHONE ENCOUNTER
5/22/2018    Call Regarding Onboarding are Choices    Attempt 2    Message on voicemail     Comments: 0 DEP      Outreach   CC

## 2018-06-01 ENCOUNTER — OFFICE VISIT (OUTPATIENT)
Dept: FAMILY MEDICINE | Facility: CLINIC | Age: 43
End: 2018-06-01
Payer: COMMERCIAL

## 2018-06-01 VITALS
HEART RATE: 76 BPM | TEMPERATURE: 98 F | DIASTOLIC BLOOD PRESSURE: 55 MMHG | WEIGHT: 115.8 LBS | HEIGHT: 64 IN | OXYGEN SATURATION: 99 % | SYSTOLIC BLOOD PRESSURE: 88 MMHG | BODY MASS INDEX: 19.77 KG/M2

## 2018-06-01 DIAGNOSIS — R07.0 THROAT PAIN: ICD-10-CM

## 2018-06-01 DIAGNOSIS — J03.90 TONSILLITIS: ICD-10-CM

## 2018-06-01 DIAGNOSIS — H10.32 ACUTE CONJUNCTIVITIS OF LEFT EYE, UNSPECIFIED ACUTE CONJUNCTIVITIS TYPE: Primary | ICD-10-CM

## 2018-06-01 LAB
DEPRECATED S PYO AG THROAT QL EIA: NORMAL
SPECIMEN SOURCE: NORMAL

## 2018-06-01 PROCEDURE — 87880 STREP A ASSAY W/OPTIC: CPT | Performed by: FAMILY MEDICINE

## 2018-06-01 PROCEDURE — 99214 OFFICE O/P EST MOD 30 MIN: CPT | Performed by: FAMILY MEDICINE

## 2018-06-01 PROCEDURE — 87081 CULTURE SCREEN ONLY: CPT | Performed by: FAMILY MEDICINE

## 2018-06-01 RX ORDER — AZITHROMYCIN 250 MG/1
TABLET, FILM COATED ORAL
Qty: 6 TABLET | Refills: 0 | Status: SHIPPED | OUTPATIENT
Start: 2018-06-01 | End: 2018-06-25

## 2018-06-01 RX ORDER — TOBRAMYCIN 3 MG/ML
1 SOLUTION/ DROPS OPHTHALMIC EVERY 4 HOURS
Qty: 1 BOTTLE | Refills: 0 | Status: SHIPPED | OUTPATIENT
Start: 2018-06-01 | End: 2018-06-08

## 2018-06-01 NOTE — PROGRESS NOTES
SUBJECTIVE:   Shaheen Flaherty is a 43 year old female who presents to clinic today for the following health issues:    Eye(s) Problem      Duration: yesterday morning    Description:  Location: left  Pain: no  Redness: YES  Discharge: YES    Accompanying signs and symptoms: nothing no pain nor itch    History (Trauma, foreign body exposure,): None    Precipitating or alleviating factors (contact use): None    Therapies tried and outcome: None - supplement this morning     2) sore throat , she has had tonsillar abscess in the past , Jan of this year  , where Abx could not help with it and she had to have aspiration of the abscess at the ER first and then I and D if it by an ENT - was on the right side of her throat and now she started to have pain on that same side last couple of days     Problem list and histories reviewed & adjusted, as indicated.  Additional history: as documented    Patient Active Problem List   Diagnosis     Spinal stenosis in cervical region     Past Surgical History:   Procedure Laterality Date     ENT SURGERY      oral surgery as a child     FUSION CERVICAL ANTERIOR TWO LEVELS  1/14/2013    Procedure: FUSION CERVICAL ANTERIOR TWO LEVELS;  C5-6 AND C6-7 ANTERIOR DISCECTOMY AND FUSION WITH MIRTA ANTERIOR INSTRUMENTATION (C-ARM, MIDAS, CONTRAVES) ;  Surgeon: Vince Frederick MD;  Location:  OR       Social History   Substance Use Topics     Smoking status: Never Smoker     Smokeless tobacco: Never Used     Alcohol use 0.0 oz/week     0 Standard drinks or equivalent per week      Comment: 2 wine daily     Family History   Problem Relation Age of Onset     Breast Cancer Maternal Aunt      Pt's mother tested and negative     Uterine Cancer Maternal Grandmother          Current Outpatient Prescriptions   Medication Sig Dispense Refill     valACYclovir (VALTREX) 500 MG tablet Take one tab by mouth twice daily for 3 days. Start at first onset of symptoms 6 tablet 6     azithromycin  "(ZITHROMAX) 250 MG tablet Two tablets first day, then one tablet daily for four days. 6 tablet 0     multivitamin, therapeutic with minerals (THERA-VIT-M) TABS Take 1 tablet by mouth daily       tobramycin (TOBREX) 0.3 % ophthalmic solution Apply 1 drop to eye every 4 hours for 7 days 1 Bottle 0     Allergies   Allergen Reactions     Penicillin V Hives     Recent Labs   Lab Test  03/06/18   1238  01/27/18   2134   CR   --   0.61   GFRESTIMATED   --   >90   GFRESTBLACK   --   >90   POTASSIUM   --   4.0   TSH  1.46   --       BP Readings from Last 3 Encounters:   06/01/18 (!) 88/55   03/06/18 100/64   01/28/18 99/68    Wt Readings from Last 3 Encounters:   06/01/18 115 lb 12.8 oz (52.5 kg)   03/06/18 118 lb (53.5 kg)   01/27/18 120 lb (54.4 kg)                  Labs reviewed in EPIC    Reviewed and updated as needed this visit by clinical staff  Allergies  Meds       Reviewed and updated as needed this visit by Provider         ROS:  Constitutional, HEENT, cardiovascular, pulmonary, GI, , musculoskeletal, neuro, skin, endocrine and psych systems are negative, except as otherwise noted.    OBJECTIVE:     BP (!) 88/55  Pulse 76  Temp 98  F (36.7  C) (Oral)  Ht 5' 4\" (1.626 m)  Wt 115 lb 12.8 oz (52.5 kg)  LMP  (LMP Unknown)  SpO2 99%  Breastfeeding? No  BMI 19.88 kg/m2  Body mass index is 19.88 kg/(m^2).  GENERAL: healthy, alert and no distress  EYES: Eyes grossly normal to inspection EXCEPT there is conjunctival erythema on the left eye and some clear tearng there , PERRL and conjunctivae and sclerae normal  HENT: normal cephalic/atraumatic, ear canals and TM's normal, nose and mouth without ulcers or lesions, oropharynx clear, oral mucous membranes moist, tonsillar erythema and tonsillar exudate more on the right tonsillar bed than left   NECK: no adenopathy, no asymmetry, masses, or scars and thyroid normal to palpation  RESP: lungs clear to auscultation - no rales, rhonchi or wheezes  CV: regular rate " and rhythm, normal S1 S2, no S3 or S4, no murmur, click or rub, no peripheral edema and peripheral pulses strong  ABDOMEN: soft, nontender, no hepatosplenomegaly, no masses and bowel sounds normal  MS: no gross musculoskeletal defects noted, no edema    Diagnostic Test Results:  none     ASSESSMENT/PLAN:       1. Acute conjunctivitis of left eye, unspecified acute conjunctivitis type  We discussed treatment for the conjunctivitis and I have sent the Abx eye drops   - tobramycin (TOBREX) 0.3 % ophthalmic solution; Apply 1 drop to eye every 4 hours for 7 days  Dispense: 1 Bottle; Refill: 0    2. Throat pain  Negative for strep , see below   - Strep, Rapid Screen  - Beta strep group A culture    3. Tonsillitis  Since she has had an abscess on this same right side , we discussed taking oral Abx now , it does not look like an abscess, only erythema and some excudate present but this is early , she just starting to feel the soreness in the throat .    - azithromycin (ZITHROMAX) 250 MG tablet; Two tablets first day, then one tablet daily for four days.  Dispense: 6 tablet; Refill: 0    RTC if no improving or worsening.  Pt is aware  and comfortable with the current plan.      Екатерина Suarez MD  [facility]

## 2018-06-01 NOTE — MR AVS SNAPSHOT
"              After Visit Summary   2018    Shaheen Flaherty    MRN: [id number]           Patient Information     Date Of Birth          1975        Visit Information        Provider Department      2018 9:30 AM Екатерина Suarez MD [facility]        Today's Diagnoses     Acute conjunctivitis of left eye, unspecified acute conjunctivitis type    -  1    Throat pain        Tonsillitis           Follow-ups after your visit        Who to contact     If you have questions or need follow up information about today's clinic visit or your schedule please contact [facility] directly at [phone].  Normal or non-critical lab and imaging results will be communicated to you by FTBprohart, letter or phone within 4 business days after the clinic has received the results. If you do not hear from us within 7 days, please contact the clinic through FTBprohart or phone. If you have a critical or abnormal lab result, we will notify you by phone as soon as possible.  Submit refill requests through [employer] or call your pharmacy and they will forward the refill request to us. Please allow 3 business days for your refill to be completed.          Additional Information About Your Visit        MyChart Information     [employer] lets you send messages to your doctor, view your test results, renew your prescriptions, schedule appointments and more. To sign up, go to [email or web address] . Click on \"Log in\" on the left side of the screen, which will take you to the Welcome page. Then click on \"Sign up Now\" on the right side of the page.     You will be asked to enter the access code listed below, as well as some personal information. Please follow the directions to create your username and password.     Your access code is: [email or web address]  Expires: 2018 12:14 PM     Your access code will  in 90 days. If you need help or a new code, please call your [facility] clinic or [phone].        Care EveryWhere " "ID     This is your Care EveryWhere ID. This could be used by other organizations to access your [facility] medical records  [id number]        Your Vitals Were     Pulse Temperature Height Last Period Pulse Oximetry Breastfeeding?    76 98  F (36.7  C) (Oral) 5' 4\" (1.626 m) (LMP Unknown) 99% No    BMI (Body Mass Index)                   19.88 kg/m2            Blood Pressure from Last 3 Encounters:   06/01/18 (!) 88/55   03/06/18 100/64   01/28/18 99/68    Weight from Last 3 Encounters:   06/01/18 115 lb 12.8 oz (52.5 kg)   03/06/18 118 lb (53.5 kg)   01/27/18 120 lb (54.4 kg)              We Performed the Following     Beta strep group A culture     Strep, Rapid Screen          Today's Medication Changes          These changes are accurate as of 6/1/18 12:14 PM.  If you have any questions, ask your nurse or doctor.               Start taking these medicines.        Dose/Directions    azithromycin 250 MG tablet   Commonly known as:  ZITHROMAX   Used for:  Tonsillitis   Started by:  Екатерина Suarez MD        Two tablets first day, then one tablet daily for four days.   Quantity:  6 tablet   Refills:  0       tobramycin 0.3 % ophthalmic solution   Commonly known as:  TOBREX   Used for:  Acute conjunctivitis of left eye, unspecified acute conjunctivitis type   Started by:  Екатерина Suarez MD        Dose:  1 drop   Apply 1 drop to eye every 4 hours for 7 days   Quantity:  1 Bottle   Refills:  0            Where to get your medicines      These medications were sent to [facility] [address] [place] [address] [place] [address] [place] [address] [place] [address]     Phone:  [phone]     azithromycin 250 MG tablet    tobramycin 0.3 % ophthalmic solution                Primary Care Provider Office Phone # Fax #    [facility] [phone] [address]  [facility] [address]        Equal Access to Services     NELDA DYE AH: Javier bear " Allan, mihaelada luosvaldoadaha, qalatishata kacourtney guy, odalis gargnola misty. So [facility] [phone].    ATENCIÓN: Si brenda cruz, tiene a brush disposición servicios gratuitos de asistencia lingüística. Trevor al [phone].    We comply with applicable federal civil rights laws and [place] laws. We do not discriminate on the basis of race, color, national origin, age, disability, sex, sexual orientation, or gender identity.            Thank you!     Thank you for choosing [facility]  for your care. Our goal is always to provide you with excellent care. Hearing back from our patients is one way we can continue to improve our services. Please take a few minutes to complete the written survey that you may receive in the mail after your visit with us. Thank you!             Your Updated Medication List - Protect others around you: Learn how to safely use, store and throw away your medicines at www.disposemymeds.org.          This list is accurate as of 6/1/18 12:14 PM.  Always use your most recent med list.                   Brand Name Dispense Instructions for use Diagnosis    azithromycin 250 MG tablet    ZITHROMAX    6 tablet    Two tablets first day, then one tablet daily for four days.    Tonsillitis       multivitamin, therapeutic with minerals Tabs tablet      Take 1 tablet by mouth daily        tobramycin 0.3 % ophthalmic solution    TOBREX    1 Bottle    Apply 1 drop to eye every 4 hours for 7 days    Acute conjunctivitis of left eye, unspecified acute conjunctivitis type       valACYclovir 500 MG tablet    VALTREX    6 tablet    Take one tab by mouth twice daily for 3 days. Start at first onset of symptoms    HSV infection

## 2018-06-02 LAB
BACTERIA SPEC CULT: NORMAL
SPECIMEN SOURCE: NORMAL

## 2018-06-13 NOTE — TELEPHONE ENCOUNTER
06/13/2018    Call Regarding Onboarding are choices     Attempt 3    Message on voicemail    Comments:       Outreach   Glenis Carmen

## 2018-06-25 ENCOUNTER — OFFICE VISIT (OUTPATIENT)
Dept: FAMILY MEDICINE | Facility: CLINIC | Age: 43
End: 2018-06-25
Payer: COMMERCIAL

## 2018-06-25 ENCOUNTER — TELEPHONE (OUTPATIENT)
Dept: FAMILY MEDICINE | Facility: CLINIC | Age: 43
End: 2018-06-25

## 2018-06-25 VITALS
BODY MASS INDEX: 19.6 KG/M2 | WEIGHT: 114.8 LBS | HEIGHT: 64 IN | HEART RATE: 84 BPM | TEMPERATURE: 98.5 F | OXYGEN SATURATION: 98 % | DIASTOLIC BLOOD PRESSURE: 60 MMHG | SYSTOLIC BLOOD PRESSURE: 89 MMHG

## 2018-06-25 DIAGNOSIS — K50.919 CROHN'S DISEASE WITH COMPLICATION, UNSPECIFIED GASTROINTESTINAL TRACT LOCATION (H): ICD-10-CM

## 2018-06-25 DIAGNOSIS — M19.90 INFLAMMATORY ARTHROPATHY: ICD-10-CM

## 2018-06-25 DIAGNOSIS — M25.60 MORNING STIFFNESS OF JOINTS: Primary | ICD-10-CM

## 2018-06-25 LAB
BASOPHILS # BLD AUTO: 0.1 10E9/L (ref 0–0.2)
BASOPHILS NFR BLD AUTO: 0.6 %
DIFFERENTIAL METHOD BLD: ABNORMAL
EOSINOPHIL # BLD AUTO: 0.2 10E9/L (ref 0–0.7)
EOSINOPHIL NFR BLD AUTO: 2.3 %
ERYTHROCYTE [DISTWIDTH] IN BLOOD BY AUTOMATED COUNT: 13.3 % (ref 10–15)
ERYTHROCYTE [SEDIMENTATION RATE] IN BLOOD BY WESTERGREN METHOD: 36 MM/H (ref 0–20)
HCT VFR BLD AUTO: 35.5 % (ref 35–47)
HGB BLD-MCNC: 11.8 G/DL (ref 11.7–15.7)
LYMPHOCYTES # BLD AUTO: 1.3 10E9/L (ref 0.8–5.3)
LYMPHOCYTES NFR BLD AUTO: 15.1 %
MCH RBC QN AUTO: 33.3 PG (ref 26.5–33)
MCHC RBC AUTO-ENTMCNC: 33.2 G/DL (ref 31.5–36.5)
MCV RBC AUTO: 100 FL (ref 78–100)
MONOCYTES # BLD AUTO: 0.7 10E9/L (ref 0–1.3)
MONOCYTES NFR BLD AUTO: 8.2 %
NEUTROPHILS # BLD AUTO: 6.4 10E9/L (ref 1.6–8.3)
NEUTROPHILS NFR BLD AUTO: 73.8 %
PLATELET # BLD AUTO: 356 10E9/L (ref 150–450)
RBC # BLD AUTO: 3.54 10E12/L (ref 3.8–5.2)
WBC # BLD AUTO: 8.7 10E9/L (ref 4–11)

## 2018-06-25 PROCEDURE — 36415 COLL VENOUS BLD VENIPUNCTURE: CPT | Performed by: FAMILY MEDICINE

## 2018-06-25 PROCEDURE — 84550 ASSAY OF BLOOD/URIC ACID: CPT | Performed by: FAMILY MEDICINE

## 2018-06-25 PROCEDURE — 86618 LYME DISEASE ANTIBODY: CPT | Performed by: FAMILY MEDICINE

## 2018-06-25 PROCEDURE — 86038 ANTINUCLEAR ANTIBODIES: CPT | Performed by: FAMILY MEDICINE

## 2018-06-25 PROCEDURE — 99214 OFFICE O/P EST MOD 30 MIN: CPT | Performed by: FAMILY MEDICINE

## 2018-06-25 PROCEDURE — 86140 C-REACTIVE PROTEIN: CPT | Performed by: FAMILY MEDICINE

## 2018-06-25 PROCEDURE — 85025 COMPLETE CBC W/AUTO DIFF WBC: CPT | Performed by: FAMILY MEDICINE

## 2018-06-25 PROCEDURE — 80053 COMPREHEN METABOLIC PANEL: CPT | Performed by: FAMILY MEDICINE

## 2018-06-25 PROCEDURE — 85652 RBC SED RATE AUTOMATED: CPT | Performed by: FAMILY MEDICINE

## 2018-06-25 PROCEDURE — 86431 RHEUMATOID FACTOR QUANT: CPT | Performed by: FAMILY MEDICINE

## 2018-06-25 PROCEDURE — 82550 ASSAY OF CK (CPK): CPT | Performed by: FAMILY MEDICINE

## 2018-06-25 RX ORDER — PREDNISONE 20 MG/1
TABLET ORAL
Qty: 20 TABLET | Refills: 0 | Status: SHIPPED | OUTPATIENT
Start: 2018-06-25 | End: 2020-01-14

## 2018-06-25 NOTE — LETTER
[facility]  [address]  [facility] [address]  Phone: [phone]               June 28, 2018        Shaheen Flaherty  [address]   [facility] [address]           Dear MsBlue,     We are writing to inform you of your test results.     We just spoke on phone as well & I believe that the inflammatory marker in blood are high due to on going inflammatory arthropathy as a symptoms of extra abdomen (outside Gastroenterology ) manifestation of the crohn's disease.    ERS-36, normal is <20, its a blood marker of chronic  inflammation in the  body  CRP is 26, normal is < 8, its a blood marker for acute inflammation in the body      You mentioned on phone how remarkably you joint ache/pain resolved within hours of taking prednisone.  That's great. I do recommend that you see rheumatologist for long term plan on how to prevent these flare up- beny once you are done with the current tapering off doses of prednisone    I hope above explanation helps define further course.  Please call me with any further questions are concerns     Rest of all labs are well within normal limits   Negative for lymes, rheumatoid factor & antibody   Normal uric acid level   Normal complete metabolic panel , that checks for  Liver & kidney functions & electrolytes /glucose   Complete blood count is within normal limits- except for MCH- an indices- that does not explain the pain/ discomfort     Please keep us posted with questions or concerns .      Best Regards,    Elaina Douglas MD  [facility]  [phone]                   Resulted Orders   CK total   Result Value Ref Range     CK Total 29 (L) 30 - 225 U/L   ESR: Erythrocyte sedimentation rate   Result Value Ref Range     Sed Rate 36 (H) 0 - 20 mm/h   Comprehensive metabolic panel   Result Value Ref Range     Sodium 137 133 - 144 mmol/L     Potassium 4.2 3.4 - 5.3 mmol/L     Chloride 104 94 - 109 mmol/L     Carbon Dioxide 27 20 - 32 mmol/L      Anion Gap 6 3 - 14 mmol/L     Glucose 83 70 - 99 mg/dL     Urea Nitrogen 8 7 - 30 mg/dL     Creatinine 0.61 0.52 - 1.04 mg/dL     GFR Estimate >90 >60 mL/min/1.7m2         Comment:         Non  GFR Calc     GFR Estimate If Black >90 >60 mL/min/1.7m2         Comment:          GFR Calc     Calcium 9.0 8.5 - 10.1 mg/dL     Bilirubin Total 0.9 0.2 - 1.3 mg/dL     Albumin 3.4 3.4 - 5.0 g/dL     Protein Total 7.7 6.8 - 8.8 g/dL     Alkaline Phosphatase 55 40 - 150 U/L     ALT 13 0 - 50 U/L     AST 14 0 - 45 U/L   CBC with platelets differential   Result Value Ref Range     WBC 8.7 4.0 - 11.0 10e9/L     RBC Count 3.54 (L) 3.8 - 5.2 10e12/L     Hemoglobin 11.8 11.7 - 15.7 g/dL     Hematocrit 35.5 35.0 - 47.0 %      78 - 100 fl     MCH 33.3 (H) 26.5 - 33.0 pg     MCHC 33.2 31.5 - 36.5 g/dL     RDW 13.3 10.0 - 15.0 %     Platelet Count 356 150 - 450 10e9/L     Diff Method Automated Method       % Neutrophils 73.8 %     % Lymphocytes 15.1 %     % Monocytes 8.2 %     % Eosinophils 2.3 %     % Basophils 0.6 %     Absolute Neutrophil 6.4 1.6 - 8.3 10e9/L     Absolute Lymphocytes 1.3 0.8 - 5.3 10e9/L     Absolute Monocytes 0.7 0.0 - 1.3 10e9/L     Absolute Eosinophils 0.2 0.0 - 0.7 10e9/L     Absolute Basophils 0.1 0.0 - 0.2 10e9/L   RHEUMATOID FACTOR   Result Value Ref Range     Rheumatoid Factor <20 <20 IU/mL   Anti Nuclear Estela IgG by IFA with Reflex   Result Value Ref Range     JAHAIRA interpretation Negative NEG^Negative         Comment:             Reference range:  <1:40  NEGATIVE  1:40 - 1:80  BORDERLINE POSITIVE  >1:80 POSITIVE   CRP INFLAMMATION   Result Value Ref Range     CRP Inflammation 26.0 (H) 0.0 - 8.0 mg/L   URIC ACID   Result Value Ref Range     Uric Acid 3.7 2.6 - 6.0 mg/dL   Lyme Disease Estela with reflex to WB Serum   Result Value Ref Range     Lyme Disease Antibodies Serum <0.01 0.00 - 0.89         Comment:         Negative, Absence of detectable Borrelia burdorferi  antibodies. A negative   result does not exclude the possibility of Borrelia burgdorferi infection. If   early Lyme disease is suspected, a second sample should be collected and   tested 2 to 4 weeks later.         If you have any questions or concerns, please call the clinic at the number listed above.         Sincerely,           Sendy Douglas MD                       Patient Name: Shaheen Flaherty                                            : 1975                                      PAGE: 1/3         Communication Managment Recipients      Letter on: 2018 by: SENDY DOUGLAS   Recipients: No Communication Management recipients.

## 2018-06-25 NOTE — MR AVS SNAPSHOT
After Visit Summary   6/25/2018    Shaheen Flaherty    MRN: [id number]           Patient Information     Date Of Birth          1975        Visit Information        Provider Department      6/25/2018 3:40 PM Elaina Douglas MD [facility]        Today's Diagnoses     Morning stiffness of joints    -  1      Care Instructions    1. Morning stiffness of joints        - CK total  - ESR: Erythrocyte sedimentation rate  - Comprehensive metabolic panel  - CBC with platelets differential  - RHEUMATOID FACTOR  - Anti Nuclear Estela IgG by IFA with Reflex  - CRP INFLAMMATION  - URIC ACID  - predniSONE (DELTASONE) 20 MG tablet; Take 3 tabs (60 mg) by mouth daily x 3 days, 2 tabs (40 mg) daily x 3 days, 1 tab (20 mg) daily x 3 days, then 1/2 tab (10 mg) x 3 days.  Dispense: 20 tablet; Refill: 0    See rheumatology    Fp with [facility]- for abdomen cramping and stooling issue's            Follow-ups after your visit        Additional Services     RHEUMATOLOGY REFERRAL       Your provider has referred you to: [facility]:  [facility]   [phone] [email or web address]  UMP: [facility] [phone]   [email or web address]  UM: [facility] [phone]   [email or web address]    Please be aware that coverage of these services is subject to the terms and limitations of your health insurance plan.  Call member services at your health plan with any benefit or coverage questions.      Please bring the following with you to your appointment:    (1) Any X-Rays, CTs or MRIs which have been performed.  Contact the facility where they were done to arrange for  prior to your scheduled appointment.    (2) List of current medications   (3) This referral request   (4) Any documents/labs given to you for this referral                  Who to  "contact     If you have questions or need follow up information about today's clinic visit or your schedule please contact [facility] directly at [phone].  Normal or non-critical lab and imaging results will be communicated to you by MyChart, letter or phone within 4 business days after the clinic has received the results. If you do not hear from us within 7 days, please contact the clinic through MyChart or phone. If you have a critical or abnormal lab result, we will notify you by phone as soon as possible.  Submit refill requests through [facility] or call your pharmacy and they will forward the refill request to us. Please allow 3 business days for your refill to be completed.          Additional Information About Your Visit        [facility] Information     [employer] lets you send messages to your doctor, view your test results, renew your prescriptions, schedule appointments and more. To sign up, go to [email or web address] . Click on \"Log in\" on the left side of the screen, which will take you to the Welcome page. Then click on \"Sign up Now\" on the right side of the page.     You will be asked to enter the access code listed below, as well as some personal information. Please follow the directions to create your username and password.     Your access code is: [email or web address]  Expires: 2018 12:14 PM     Your access code will  in 90 days. If you need help or a new code, please call your [facility] clinic or [phone].        Care EveryWhere ID     This is your Care EveryWhere ID. This could be used by other organizations to access your [facility] medical records  [id number]        Your Vitals Were     Pulse Temperature Height Last Period Pulse Oximetry BMI (Body Mass Index)    84 98.5  F (36.9  C) (Oral) 5' 4\" (1.626 m) 2018 (Approximate) 98% 19.71 kg/m2       Blood Pressure from Last 3 Encounters:   18 (!) 89/60   18 (!) 88/55   18 100/64    Weight from Last 3 " Encounters:   06/25/18 114 lb 12.8 oz (52.1 kg)   06/01/18 115 lb 12.8 oz (52.5 kg)   03/06/18 118 lb (53.5 kg)              We Performed the Following     Anti Nuclear Estela IgG by IFA with Reflex     CBC with platelets differential     CK total     Comprehensive metabolic panel     CRP INFLAMMATION     ESR: Erythrocyte sedimentation rate     RHEUMATOID FACTOR     RHEUMATOLOGY REFERRAL     URIC ACID          Today's Medication Changes          These changes are accurate as of 6/25/18  4:19 PM.  If you have any questions, ask your nurse or doctor.               Start taking these medicines.        Dose/Directions    predniSONE 20 MG tablet   Commonly known as:  DELTASONE   Used for:  Morning stiffness of joints   Started by:  Elaina Douglas MD        Take 3 tabs (60 mg) by mouth daily x 3 days, 2 tabs (40 mg) daily x 3 days, 1 tab (20 mg) daily x 3 days, then 1/2 tab (10 mg) x 3 days.   Quantity:  20 tablet   Refills:  0            Where to get your medicines      These medications were sent to [facility] [address] [place] [address] & [facility]  [address] [place] [id number]     Phone:  [phone]     predniSONE 20 MG tablet                Primary Care Provider Office Phone # Fax #    [facility] [phone] [address]  [facility] [address]        Equal Access to Services     NELDA DYE AH: Hadisidoro villavicencioo Soleno, waaxda luqadaha, qaybta kaalmada adestefania, odalis guzman . So [facility] [phone].    ATENCIÓN: Si habla español, tiene a brush disposición servicios gratuitos de asistencia lingüística. Llame al [phone].    We comply with applicable federal civil rights laws and [place] laws. We do not discriminate on the basis of race, color, national origin, age, disability, sex, sexual orientation, or gender identity.            Thank you!     Thank you for choosing [facility]  CLINIC  for your care. Our goal is always to provide you with excellent care. Hearing back from our patients is one way we can continue to improve our services. Please take a few minutes to complete the written survey that you may receive in the mail after your visit with us. Thank you!             Your Updated Medication List - Protect others around you: Learn how to safely use, store and throw away your medicines at www.disposemymeds.org.          This list is accurate as of 6/25/18  4:19 PM.  Always use your most recent med list.                   Brand Name Dispense Instructions for use Diagnosis    IRON PO           multivitamin, therapeutic with minerals Tabs tablet      Take 1 tablet by mouth daily        OMEGA 3 PO           predniSONE 20 MG tablet    DELTASONE    20 tablet    Take 3 tabs (60 mg) by mouth daily x 3 days, 2 tabs (40 mg) daily x 3 days, 1 tab (20 mg) daily x 3 days, then 1/2 tab (10 mg) x 3 days.    Morning stiffness of joints       valACYclovir 500 MG tablet    VALTREX    6 tablet    Take one tab by mouth twice daily for 3 days. Start at first onset of symptoms    HSV infection       VITAMIN D (CHOLECALCIFEROL) PO      Take by mouth daily

## 2018-06-25 NOTE — PATIENT INSTRUCTIONS
1. Morning stiffness of joints        - CK total  - ESR: Erythrocyte sedimentation rate  - Comprehensive metabolic panel  - CBC with platelets differential  - RHEUMATOID FACTOR  - Anti Nuclear Estela IgG by IFA with Reflex  - CRP INFLAMMATION  - URIC ACID  - predniSONE (DELTASONE) 20 MG tablet; Take 3 tabs (60 mg) by mouth daily x 3 days, 2 tabs (40 mg) daily x 3 days, 1 tab (20 mg) daily x 3 days, then 1/2 tab (10 mg) x 3 days.  Dispense: 20 tablet; Refill: 0    See rheumatology    Fp with [facility]- for abdomen cramping and stooling issue's

## 2018-06-25 NOTE — TELEPHONE ENCOUNTER
Reason for Call:  Question about Steroid injection    Detailed comments: she has Crones  she is asking if she can come to this  clinin instead of her PCP?    Phone Number Patient can be reached at: Home number on file [phone] (home)    Best Time: anytime    Can we leave a detailed message on this number? YES    Call taken on 6/25/2018 at 10:16 AM by Arpit Carrasquillo

## 2018-06-25 NOTE — PROGRESS NOTES
SUBJECTIVE:   Shaheen Flaherty is a 43 year old female who presents to clinic today for the following health issues:  Diagnosed with  crohn's disease at [facility]- colonoscopy & biopsy were consistent  (no av records)  Seen by [facility]- and was given prednisone- for a month  Did not want to take steroid but eventaully did and felt healed immediately  She had a  Lot of abdomen cramps, diarrhea at the time and associated joint aches & pains.  She was advised to take additional medications- for long term-sulfasalazine which she did for a month but refused  wkly humaria & stopped for  chronic- long term medications       She feels she healed herself with own research, She believes its wheat crackers she consumed in large quantities & that caused inflammation of the gut and cured own body by stopping them  Follows a very  clean diet & supplements   and She certified to be eligible for medical marijuana    & did not take any prescribed marijuana but   Started the pills for joint pain just yesterday due to extreme pain in various joints, body aches  initially wrist  then ankles & now knee  Still morning stiffness and aches & pain    She reports she had  no symptoms for past 1.5 yrs unless a couple months ago as above and worseing as above  Gastroenterology symptoms are managble   Only extra stool daily & minor abdomen cramps  Called Gastroenterology  And they told her to see primary care physicain      Joint problem/pain      Duration: x1 month, has worsened in the past week     Description  Location: all joints - mainly in wrists, knees and ankles     Intensity:  Moderate to severe     Accompanying signs and symptoms: constant ache when moving, no pain with sitting still     History  Previous similar problem: YES - when dx with crohn's   Previous evaluation:  none    Precipitating or alleviating factors:  Trauma or overuse: no   Aggravating factors include: walking, climbing stairs, lifting and exercise    Therapies tried  "and outcome: marijuana - helps with sx temporarily       PROBLEMS TO ADD ON...    Problem list and histories reviewed & adjusted, as indicated.  Additional history: as documented    Patient Active Problem List   Diagnosis     Spinal stenosis in cervical region     Crohn's disease with complication, unspecified gastrointestinal tract location (H)     Past Surgical History:   Procedure Laterality Date     ENT SURGERY      oral surgery as a child     FUSION CERVICAL ANTERIOR TWO LEVELS  1/14/2013    Procedure: FUSION CERVICAL ANTERIOR TWO LEVELS;  C5-6 AND C6-7 ANTERIOR DISCECTOMY AND FUSION WITH MIRTA ANTERIOR INSTRUMENTATION (C-ARM, MIDAS, CONTRAVES) ;  Surgeon: Vince Frederick MD;  Location:  OR       Social History   Substance Use Topics     Smoking status: Never Smoker     Smokeless tobacco: Never Used     Alcohol use 0.0 oz/week     0 Standard drinks or equivalent per week      Comment: 2 wine daily     Family History   Problem Relation Age of Onset     Breast Cancer Maternal Aunt      Pt's mother tested and negative     Uterine Cancer Maternal Grandmother            Reviewed and updated as needed this visit by clinical staff       Reviewed and updated as needed this visit by Provider         ROS:  Constitutional, HEENT, cardiovascular, pulmonary, gi and gu systems are negative, except as otherwise noted.    OBJECTIVE:     BP (!) 89/60  Pulse 84  Temp 98.5  F (36.9  C) (Oral)  Ht 5' 4\" (1.626 m)  Wt 114 lb 12.8 oz (52.1 kg)  LMP 06/05/2018 (Approximate)  SpO2 98%  BMI 19.71 kg/m2  Body mass index is 19.71 kg/(m^2).  GENERAL: healthy, alert and no distress  HENT: ear canals and TM's normal, nose and mouth without ulcers or lesions  NECK: no adenopathy, no asymmetry, masses, or scars and thyroid normal to palpation  RESP: lungs clear to auscultation - no rales, rhonchi or wheezes  CV: regular rate and rhythm, normal S1 S2, no S3 or S4, no murmur, click or rub, no peripheral edema and peripheral " pulses strong  ABDOMEN: soft, nontender, no hepatosplenomegaly, no masses and bowel sounds normal  MS: no gross musculoskeletal defects noted, no edema    Diagnostic Test Results:    ASSESSMENT/PLAN:   1. Morning stiffness of joints  - CK total, low   - ESR: Erythrocyte sedimentation rate   - Comprehensive metabolic panel  - CBC with platelets differential- within normal limits  - RHEUMATOID FACTOR- negative   - Anti Nuclear Estela IgG by IFA with Reflex- normal   - CRP INFLAMMATION  - URIC ACID      2. Inflammatory arthropathy    - ESR: Erythrocyte sedimentation rate  - CRP INFLAMMATION  - RHEUMATOLOGY REFERRAL    Discussed with patient - most likely inflammatory arthropathy  ESR/CRP- both elevated  She reports within hours of taking prednisone  - predniSONE (DELTASONE) 20 MG tablet; Take 3 tabs (60 mg) by mouth daily x 3 days, 2 tabs (40 mg) daily x 3 days, 1 tab (20 mg) daily x 3 days, then 1/2 tab (10 mg) x 3 days.  Dispense: 20 tablet; Refill: 0  I do recommend for recurrance of joint pain/ morning stiffness to  consult rheumatology                                                                 - Lyme Disease Estela with reflex to WB Serum  3. Crohn's disease with complication, unspecified gastrointestinal tract location (H)  No Gastroenterology symptoms  - Omega-3 Fatty Acids (OMEGA 3 PO);   - VITAMIN D, CHOLECALCIFEROL, PO; Take by mouth daily  - IRON PO;       Elaina Douglas MD  [facility]

## 2018-06-25 NOTE — NURSING NOTE
"Chief Complaint   Patient presents with     Joint Pain     BP (!) 89/60  Pulse 84  Temp 98.5  F (36.9  C) (Oral)  Ht 5' 4\" (1.626 m)  Wt 114 lb 12.8 oz (52.1 kg)  LMP 06/05/2018 (Approximate)  SpO2 98%  BMI 19.71 kg/m2 Estimated body mass index is 19.71 kg/(m^2) as calculated from the following:    Height as of this encounter: 5' 4\" (1.626 m).    Weight as of this encounter: 114 lb 12.8 oz (52.1 kg).  Medication Reconciliation: complete      Health Maintenance that is potentially due pending provider review:  NONE    n/a    TRACEY Garner  "

## 2018-06-25 NOTE — LETTER
June 28, 2018      Shaheen MADRIGAL Reynold  [address]   [facility] [address]        Dear ,    We are writing to inform you of your test results.    I have tried to reach you on phone and left a Voice message     The blood test shows that the nonspecific marker for inflammation are high   CRP and ESR- these should be repeated- with return office visit- if pain / skin sensitivity has persisted     Rest of all labs are well within normal limits   Negative for lymes, rheumatoid factor & antibody   Normal uric acid level   Normal complete metabolic panel , that checks for  Liver & kidney functions & electrolytes /glucose   Complete blood count is within normal limits- except for MCH- an indices- that does not explain the pain/ discomfort     Please keep us posted with questions or concerns .     Resulted Orders   CK total   Result Value Ref Range    CK Total 29 (L) 30 - 225 U/L   ESR: Erythrocyte sedimentation rate   Result Value Ref Range    Sed Rate 36 (H) 0 - 20 mm/h   Comprehensive metabolic panel   Result Value Ref Range    Sodium 137 133 - 144 mmol/L    Potassium 4.2 3.4 - 5.3 mmol/L    Chloride 104 94 - 109 mmol/L    Carbon Dioxide 27 20 - 32 mmol/L    Anion Gap 6 3 - 14 mmol/L    Glucose 83 70 - 99 mg/dL    Urea Nitrogen 8 7 - 30 mg/dL    Creatinine 0.61 0.52 - 1.04 mg/dL    GFR Estimate >90 >60 mL/min/1.7m2      Comment:      Non  GFR Calc    GFR Estimate If Black >90 >60 mL/min/1.7m2      Comment:       GFR Calc    Calcium 9.0 8.5 - 10.1 mg/dL    Bilirubin Total 0.9 0.2 - 1.3 mg/dL    Albumin 3.4 3.4 - 5.0 g/dL    Protein Total 7.7 6.8 - 8.8 g/dL    Alkaline Phosphatase 55 40 - 150 U/L    ALT 13 0 - 50 U/L    AST 14 0 - 45 U/L   CBC with platelets differential   Result Value Ref Range    WBC 8.7 4.0 - 11.0 10e9/L    RBC Count 3.54 (L) 3.8 - 5.2 10e12/L    Hemoglobin 11.8 11.7 - 15.7 g/dL    Hematocrit 35.5 35.0 - 47.0 %     78 - 100 fl    MCH 33.3 (H) 26.5 -  33.0 pg    MCHC 33.2 31.5 - 36.5 g/dL    RDW 13.3 10.0 - 15.0 %    Platelet Count 356 150 - 450 10e9/L    Diff Method Automated Method     % Neutrophils 73.8 %    % Lymphocytes 15.1 %    % Monocytes 8.2 %    % Eosinophils 2.3 %    % Basophils 0.6 %    Absolute Neutrophil 6.4 1.6 - 8.3 10e9/L    Absolute Lymphocytes 1.3 0.8 - 5.3 10e9/L    Absolute Monocytes 0.7 0.0 - 1.3 10e9/L    Absolute Eosinophils 0.2 0.0 - 0.7 10e9/L    Absolute Basophils 0.1 0.0 - 0.2 10e9/L   RHEUMATOID FACTOR   Result Value Ref Range    Rheumatoid Factor <20 <20 IU/mL   Anti Nuclear Estela IgG by IFA with Reflex   Result Value Ref Range    JAHAIRA interpretation Negative NEG^Negative      Comment:                                         Reference range:  <1:40  NEGATIVE  1:40 - 1:80  BORDERLINE POSITIVE  >1:80 POSITIVE     CRP INFLAMMATION   Result Value Ref Range    CRP Inflammation 26.0 (H) 0.0 - 8.0 mg/L   URIC ACID   Result Value Ref Range    Uric Acid 3.7 2.6 - 6.0 mg/dL   Lyme Disease Estela with reflex to WB Serum   Result Value Ref Range    Lyme Disease Antibodies Serum <0.01 0.00 - 0.89      Comment:      Negative, Absence of detectable Borrelia burdorferi antibodies. A negative   result does not exclude the possibility of Borrelia burgdorferi infection. If   early Lyme disease is suspected, a second sample should be collected and   tested 2 to 4 weeks later.         If you have any questions or concerns, please call the clinic at the number listed above.       Sincerely,        Elaina Douglas MD

## 2018-06-25 NOTE — TELEPHONE ENCOUNTER
"Patient states she has history of Crohn's  disease.  Not on problem list.  States she is currently having a flare up.  Has been given steroids pills in past for this.  Scheduled patient to see AS today at 3:40    Asked who her PCP is.  \"[facility] at [facility]\"  Lucy Sandoval RN  "

## 2018-06-26 LAB
ALBUMIN SERPL-MCNC: 3.4 G/DL (ref 3.4–5)
ALP SERPL-CCNC: 55 U/L (ref 40–150)
ALT SERPL W P-5'-P-CCNC: 13 U/L (ref 0–50)
ANION GAP SERPL CALCULATED.3IONS-SCNC: 6 MMOL/L (ref 3–14)
AST SERPL W P-5'-P-CCNC: 14 U/L (ref 0–45)
BILIRUB SERPL-MCNC: 0.9 MG/DL (ref 0.2–1.3)
BUN SERPL-MCNC: 8 MG/DL (ref 7–30)
CALCIUM SERPL-MCNC: 9 MG/DL (ref 8.5–10.1)
CHLORIDE SERPL-SCNC: 104 MMOL/L (ref 94–109)
CK SERPL-CCNC: 29 U/L (ref 30–225)
CO2 SERPL-SCNC: 27 MMOL/L (ref 20–32)
CREAT SERPL-MCNC: 0.61 MG/DL (ref 0.52–1.04)
CRP SERPL-MCNC: 26 MG/L (ref 0–8)
GFR SERPL CREATININE-BSD FRML MDRD: >90 ML/MIN/1.7M2
GLUCOSE SERPL-MCNC: 83 MG/DL (ref 70–99)
POTASSIUM SERPL-SCNC: 4.2 MMOL/L (ref 3.4–5.3)
PROT SERPL-MCNC: 7.7 G/DL (ref 6.8–8.8)
RHEUMATOID FACT SER NEPH-ACNC: <20 IU/ML (ref 0–20)
SODIUM SERPL-SCNC: 137 MMOL/L (ref 133–144)
URATE SERPL-MCNC: 3.7 MG/DL (ref 2.6–6)

## 2018-06-27 ENCOUNTER — TELEPHONE (OUTPATIENT)
Dept: FAMILY MEDICINE | Facility: CLINIC | Age: 43
End: 2018-06-27

## 2018-06-27 DIAGNOSIS — R53.83 OTHER FATIGUE: Primary | ICD-10-CM

## 2018-06-27 LAB — ANA SER QL IF: NEGATIVE

## 2018-06-27 NOTE — TELEPHONE ENCOUNTER
Reason for Call:  Request for results:    Name of test or procedure: labs     Date of test of procedure: 6/25/18     Location of the test or procedure: up fam    OK to leave the result message on voice mail or with a family member? YES    Phone number Patient can be reached at:  Home number on file [phone] (home)    Additional comments: please call with results    Call taken on 6/27/2018 at 11:03 AM by Jesse Saleh

## 2018-06-28 PROBLEM — K50.919 CROHN'S DISEASE WITH COMPLICATION, UNSPECIFIED GASTROINTESTINAL TRACT LOCATION (H): Status: ACTIVE | Noted: 2018-06-28

## 2018-06-28 LAB — B BURGDOR IGG+IGM SER QL: <0.01 (ref 0–0.89)

## 2018-06-28 NOTE — PROGRESS NOTES
Send lab & letter    I have tried to reach you on phone and left a Voice message    The blood test shows that the nonspecific marker for inflammation are high  CRP and ESR- these should be repeated- with return office visit- if pain / skin sensitivity has persisted    Rest of all labs are well within normal limits  Negative for lymes, rheumatoid factor & antibody  Normal uric acid level  Normal complete metabolic panel , that checks for  Liver & kidney functions & electrolytes /glucose   Complete blood count is within normal limits- except for MCH- an indices- that does not explain the pain/ discomfort    Please keep us posted with questions or concerns .      Best Regards,    Elaina Douglas MD  [facility]  [phone]

## 2018-06-28 NOTE — TELEPHONE ENCOUNTER
Dear Triage,    I left a brief message for her to call back and discuss the lab  I have asked the team to send out letter    You can review the letter further as below    I have tried to reach you on phone and left a Voice message    The blood test shows that the nonspecific marker for inflammation are high  CRP and ESR- these should be repeated- with return office visit- if pain / skin sensitivity has persisted    Rest of all labs are well within normal limits  Negative for lymes, rheumatoid factor & antibody  Normal uric acid level  Normal complete metabolic panel , that checks for  Liver & kidney functions & electrolytes /glucose   Complete blood count is within normal limits- except for MCH- an indices- that does not explain the pain/ discomfort    Please keep us posted with questions or concerns .      Best Regards,    Elaina Douglas MD  [facility]  [phone]

## 2018-07-21 ENCOUNTER — NURSE TRIAGE (OUTPATIENT)
Dept: NURSING | Facility: CLINIC | Age: 43
End: 2018-07-21

## 2018-07-21 NOTE — TELEPHONE ENCOUNTER
"\"I get recurring bladder infections and I've tried treating it on my own\".  Caller just spoke to nurse line a half hour ago regarding this.  She was advised that time that treatment cannot be done over the phone after hours and her options are [facility] or Urgent Care.  Again, discussed options, caller said that she tried [facility] and they advised for her to be seen.      Advised caller to follow the advice of [facility] and go to  today.  Caller appears to understand directives and agrees with plan.      Abbie Solis RN  [facility]      "

## 2018-07-21 NOTE — TELEPHONE ENCOUNTER
"\"I know I have a bladder infection as I get them when have a new sexual partner.\"  Patient asking if prescription can be called in or if she needs to be seen.  Information provided for [facility], and for [facility] locations near her if [facility] unable to provide care.  "

## 2018-10-24 ENCOUNTER — RADIANT APPOINTMENT (OUTPATIENT)
Dept: GENERAL RADIOLOGY | Facility: CLINIC | Age: 43
End: 2018-10-24
Attending: FAMILY MEDICINE
Payer: COMMERCIAL

## 2018-10-24 ENCOUNTER — OFFICE VISIT (OUTPATIENT)
Dept: FAMILY MEDICINE | Facility: CLINIC | Age: 43
End: 2018-10-24
Payer: COMMERCIAL

## 2018-10-24 VITALS
HEIGHT: 64 IN | DIASTOLIC BLOOD PRESSURE: 59 MMHG | OXYGEN SATURATION: 100 % | HEART RATE: 76 BPM | WEIGHT: 122 LBS | BODY MASS INDEX: 20.83 KG/M2 | SYSTOLIC BLOOD PRESSURE: 83 MMHG

## 2018-10-24 DIAGNOSIS — R10.9 RIGHT FLANK PAIN: ICD-10-CM

## 2018-10-24 DIAGNOSIS — K50.919 CROHN'S DISEASE WITH COMPLICATION, UNSPECIFIED GASTROINTESTINAL TRACT LOCATION (H): Primary | ICD-10-CM

## 2018-10-24 LAB

## 2018-10-24 PROCEDURE — 99214 OFFICE O/P EST MOD 30 MIN: CPT | Performed by: FAMILY MEDICINE

## 2018-10-24 PROCEDURE — 81001 URINALYSIS AUTO W/SCOPE: CPT | Performed by: FAMILY MEDICINE

## 2018-10-24 PROCEDURE — 74019 RADEX ABDOMEN 2 VIEWS: CPT | Mod: FY

## 2018-10-24 RX ORDER — PREDNISONE 20 MG/1
TABLET ORAL
Qty: 20 TABLET | Refills: 0 | Status: SHIPPED | OUTPATIENT
Start: 2018-10-24 | End: 2019-05-23

## 2018-10-24 NOTE — MR AVS SNAPSHOT
After Visit Summary   10/24/2018    Shaheen Flaherty    MRN: [id number]           Patient Information     Date Of Birth          1975        Visit Information        Provider Department      10/24/2018 2:20 PM Elaina Douglas MD [facility]        Today's Diagnoses     Crohn's disease with complication, unspecified gastrointestinal tract location (H)    -  1    Right flank pain          Care Instructions    No signs of kidney stones  Urine has microscopic hematuria  prednisone for tapering doses for chrons flare up    CT scan if pain worsens for kidney stone search            Follow-ups after your visit        Future tests that were ordered for you today     Open Future Orders        Priority Expected Expires Ordered    CT Abdomen Pelvis w/o & w Contrast Routine  10/24/2019 10/24/2018            Who to contact     If you have questions or need follow up information about today's clinic visit or your schedule please contact [facility] directly at [phone].  Normal or non-critical lab and imaging results will be communicated to you by [employer], letter or phone within 4 business days after the clinic has received the results. If you do not hear from us within 7 days, please contact the clinic through [employer] or phone. If you have a critical or abnormal lab result, we will notify you by phone as soon as possible.  Submit refill requests through [employer] or call your pharmacy and they will forward the refill request to us. Please allow 3 business days for your refill to be completed.          Additional Information About Your Visit        MyChart Information     [employer] gives you secure access to your electronic health record. If you see a primary care provider, you can also send messages to your care team and make appointments. If you have questions, please call your primary care clinic.  If you do not have a primary care provider, please call [phone] and they will  "assist you.        Care EveryWhere ID     This is your Care EveryWhere ID. This could be used by other organizations to access your [facility] medical records  [id number]        Your Vitals Were     Pulse Height Pulse Oximetry Breastfeeding? BMI (Body Mass Index)       76 5' 4\" (1.626 m) 100% No 20.94 kg/m2        Blood Pressure from Last 3 Encounters:   10/24/18 (!) 83/59   06/25/18 (!) 89/60   06/01/18 (!) 88/55    Weight from Last 3 Encounters:   10/24/18 122 lb (55.3 kg)   06/25/18 114 lb 12.8 oz (52.1 kg)   06/01/18 115 lb 12.8 oz (52.5 kg)              We Performed the Following     CBC with platelets differential     CRP, inflammation     UA reflex to Microscopic and Culture     Urine Microscopic     XR Abdomen 2 Views          Today's Medication Changes          These changes are accurate as of 10/24/18  3:34 PM.  If you have any questions, ask your nurse or doctor.               These medicines have changed or have updated prescriptions.        Dose/Directions    * predniSONE 20 MG tablet   Commonly known as:  DELTASONE   This may have changed:  Another medication with the same name was added. Make sure you understand how and when to take each.   Used for:  Morning stiffness of joints   Changed by:  Elaina Douglas MD        Take 3 tabs (60 mg) by mouth daily x 3 days, 2 tabs (40 mg) daily x 3 days, 1 tab (20 mg) daily x 3 days, then 1/2 tab (10 mg) x 3 days.   Quantity:  20 tablet   Refills:  0       * predniSONE 20 MG tablet   Commonly known as:  DELTASONE   This may have changed:  You were already taking a medication with the same name, and this prescription was added. Make sure you understand how and when to take each.   Used for:  Crohn's disease with complication, unspecified gastrointestinal tract location (H)   Changed by:  Elaina Douglas MD        Take 3 tabs (60 mg) by mouth daily x 3 days, 2 tabs (40 mg) daily x 3 days, 1 tab (20 mg) daily x 3 days, then 1/2 tab (10 mg) x 3 days. "   Quantity:  20 tablet   Refills:  0       * Notice:  This list has 2 medication(s) that are the same as other medications prescribed for you. Read the directions carefully, and ask your doctor or other care provider to review them with you.         Where to get your medicines      These medications were sent to [id number] - [facility] [address]  [address] [address]     Phone:  [phone]     predniSONE 20 MG tablet                Primary Care Provider Office Phone # Fax #    [facility] [phone]       [address]  [facility] [address]        Equal Access to Services     NELDA DYE : Javier Lemus, wamalika liu, qalatishata kaalmada malena, odalis jay. So [facility] [phone].    ATENCIÓN: Si habla español, tiene a brush disposición servicios gratuitos de asistencia lingüística. Llame al [phone].    We comply with applicable federal civil rights laws and [place] laws. We do not discriminate on the basis of race, color, national origin, age, disability, sex, sexual orientation, or gender identity.            Thank you!     Thank you for choosing [facility]  for your care. Our goal is always to provide you with excellent care. Hearing back from our patients is one way we can continue to improve our services. Please take a few minutes to complete the written survey that you may receive in the mail after your visit with us. Thank you!             Your Updated Medication List - Protect others around you: Learn how to safely use, store and throw away your medicines at www.disposemymeds.org.          This list is accurate as of 10/24/18  3:34 PM.  Always use your most recent med list.                   Brand Name Dispense Instructions for use Diagnosis    IRON PO       Crohn's disease with complication, unspecified gastrointestinal tract location  (H)       multivitamin, therapeutic with minerals Tabs tablet      Take 1 tablet by mouth daily        OMEGA 3 PO       Crohn's disease with complication, unspecified gastrointestinal tract location (H)       * predniSONE 20 MG tablet    DELTASONE    20 tablet    Take 3 tabs (60 mg) by mouth daily x 3 days, 2 tabs (40 mg) daily x 3 days, 1 tab (20 mg) daily x 3 days, then 1/2 tab (10 mg) x 3 days.    Morning stiffness of joints       * predniSONE 20 MG tablet    DELTASONE    20 tablet    Take 3 tabs (60 mg) by mouth daily x 3 days, 2 tabs (40 mg) daily x 3 days, 1 tab (20 mg) daily x 3 days, then 1/2 tab (10 mg) x 3 days.    Crohn's disease with complication, unspecified gastrointestinal tract location (H)       valACYclovir 500 MG tablet    VALTREX    6 tablet    Take one tab by mouth twice daily for 3 days. Start at first onset of symptoms    HSV infection       VITAMIN D (CHOLECALCIFEROL) PO      Take by mouth daily    Crohn's disease with complication, unspecified gastrointestinal tract location (H)       * Notice:  This list has 2 medication(s) that are the same as other medications prescribed for you. Read the directions carefully, and ask your doctor or other care provider to review them with you.

## 2018-10-24 NOTE — NURSING NOTE
"Chief Complaint   Patient presents with     Back Pain     chrons disease        Initial BP (!) 83/59  Pulse 76  Ht 5' 4\" (1.626 m)  Wt 122 lb (55.3 kg)  SpO2 100%  Breastfeeding? No  BMI 20.94 kg/m2 Estimated body mass index is 20.94 kg/(m^2) as calculated from the following:    Height as of this encounter: 5' 4\" (1.626 m).    Weight as of this encounter: 122 lb (55.3 kg).  BP completed using cuff size: regular    Health Maintenance Due   Topic Date Due     TETANUS IMMUNIZATION (SYSTEM ASSIGNED)  03/14/1994     HIV SCREEN (SYSTEM ASSIGNED)  03/14/1994     INFLUENZA VACCINE (1) 09/01/2018         Mercedez Ramirez MA 10/24/18        "

## 2019-01-01 NOTE — PATIENT INSTRUCTIONS
Warm pack as needed   Ok to take over the counter tylenol as needed   Xray looks ok    Microscopic blood in urine possible from  Expected periods  Blood test for CRP/CBC- if high- start steroid  Ok to get blood test as lab only appointment  Steroid in tapering dose is sent- if more suspicion of flare up of Crohns    Xray look normal   Official report is pending    Please keep us posted with questions or concerns .      Best Regards,    Elaina Douglas MD  [facility]  [phone]                 2019 01:22

## 2019-05-23 ENCOUNTER — OFFICE VISIT (OUTPATIENT)
Dept: FAMILY MEDICINE | Facility: CLINIC | Age: 44
End: 2019-05-23
Payer: COMMERCIAL

## 2019-05-23 VITALS
BODY MASS INDEX: 19.81 KG/M2 | RESPIRATION RATE: 16 BRPM | SYSTOLIC BLOOD PRESSURE: 90 MMHG | TEMPERATURE: 99.1 F | OXYGEN SATURATION: 98 % | HEART RATE: 79 BPM | DIASTOLIC BLOOD PRESSURE: 60 MMHG | WEIGHT: 116 LBS | HEIGHT: 64 IN

## 2019-05-23 DIAGNOSIS — K50.919 CROHN'S DISEASE WITH COMPLICATION, UNSPECIFIED GASTROINTESTINAL TRACT LOCATION (H): ICD-10-CM

## 2019-05-23 PROCEDURE — 99214 OFFICE O/P EST MOD 30 MIN: CPT | Performed by: FAMILY MEDICINE

## 2019-05-23 RX ORDER — PREDNISONE 10 MG/1
10 TABLET ORAL DAILY
Qty: 30 TABLET | Refills: 0 | Status: SHIPPED | OUTPATIENT
Start: 2019-05-23 | End: 2019-06-20

## 2019-05-23 RX ORDER — PREDNISONE 20 MG/1
TABLET ORAL
Qty: 20 TABLET | Refills: 0 | Status: SHIPPED | OUTPATIENT
Start: 2019-05-23 | End: 2019-05-23

## 2019-05-23 ASSESSMENT — MIFFLIN-ST. JEOR: SCORE: 1166.17

## 2019-05-23 NOTE — PATIENT INSTRUCTIONS
1. Crohn's disease with complication, unspecified gastrointestinal tract location (H) & migratory arthritis     - predniSONE (DELTASONE) (10 mg) x 7 days  Then 5 mg for 7 days  Ok to stop in 2 weeks

## 2019-05-23 NOTE — PROGRESS NOTES
"Subjective     Shaheen Flaherty is a 44 year old female who presents to clinic today for the following health issues:    HPI   Patient is here to follow up on general joint pain  Exact same presentation as in Oct 2018--had left over 4 mg prednisone, started taking it since 5 days and it makes a huge difference  Hoping to get refill on oral prednisone gave patient  Patient is hoping to go on another course of prednisone. Pain rating at 10/10--although patient feels better now since she started taking some left over prednisone since Sunday night.    Migratory joint pains, moderate to sever- unable to excercise .  Started from right elbow, and then affected shoulder and neck and then back on wrist and lately wrist as well.    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 She is tearing because she has been otherwise staying well hydrated, trying to manage work/ life balance well and disappointed that the flare up happened.    She has seen by  rheumatologist and declined humaria or any long term medications  Diagnosed with  crohn's disease at [facility]- colonoscopy & biopsy were consistent      PROBLEMS TO ADD ON...    BP Readings from Last 3 Encounters:   05/23/19 90/60   10/24/18 (!) 83/59   06/25/18 (!) 89/60    Wt Readings from Last 3 Encounters:   05/23/19 52.6 kg (116 lb)   10/24/18 55.3 kg (122 lb)   06/25/18 52.1 kg (114 lb 12.8 oz)                    PROBLEMS TO ADD ON...  Reviewed and updated as needed this visit by Provider         Review of Systems   ROS COMP: Constitutional, HEENT, cardiovascular, pulmonary, GI, , musculoskeletal, neuro, skin, endocrine and psych systems are negative, except as otherwise noted.      Objective    BP 90/60 (BP Location: Left arm, Patient Position: Sitting, Cuff Size: Adult Regular)   Pulse 79   Temp 99.1  F (37.3  C) (Oral)   Resp 16   Ht 1.626 m (5' 4\")   Wt 52.6 kg (116 lb)   SpO2 98%   Breastfeeding? No   BMI 19.91 kg/m    Body " mass index is 19.91 kg/m .  Physical Exam   GENERAL: healthy, alert and no distress  NECK: no adenopathy, no asymmetry, masses, or scars and thyroid normal to palpation  RESP: lungs clear to auscultation - no rales, rhonchi or wheezes  CV: regular rate and rhythm, normal S1 S2, no S3 or S4, no murmur, click or rub, no peripheral edema and peripheral pulses strong  ABDOMEN: soft, nontender, no hepatosplenomegaly, no masses and bowel sounds normal  MS: no gross musculoskeletal defects noted, no edema  PSYCH: mentation appears normal, affect normal/bright    Diagnostic Test Results:  Labs reviewed in Epic        Assessment & Plan     1. Crohn's disease with complication, unspecified gastrointestinal tract location (H)  Assessment: 43 yo female, with known history of crohn;s disease with migratory arthralgia/arthrtis, 2nd flare up since October 2018- patient has declined immunomodulating , long term medications . Has seen rheumatologist-and declined further consultation.  - predniSONE (DELTASONE) 10 MG tablet; Take 1 tablet (10 mg) by mouth daily  Dispense: 30 tablet; Refill: 0  She is also very concerned about taking prednisone , and tried over the counter NSAID and continues to be in moderate to severe pain from arthralgia & arthritis.  Low dose prednisone , start at 10 mg once daily and in 1- 2 weeks down to 1/2 tab for next 1-2 weeks, then everyother day- till all gone.    We discussed that further flare up are possible & she understands that    Potential medication side effects were discussed with the patient; let me know if any occur.             Return in about 1 month (around 6/20/2019) for concerns,unresolved.    Elaina Douglas MD  [facility]

## 2019-05-23 NOTE — NURSING NOTE
"Chief Complaint   Patient presents with     Crohns     BP 90/60 (BP Location: Left arm, Patient Position: Sitting, Cuff Size: Adult Regular)   Pulse 79   Temp 99.1  F (37.3  C) (Oral)   Resp 16   Ht 1.626 m (5' 4\")   Wt 52.6 kg (116 lb)   SpO2 98%   Breastfeeding? No   BMI 19.91 kg/m   Estimated body mass index is 19.91 kg/m  as calculated from the following:    Height as of this encounter: 1.626 m (5' 4\").    Weight as of this encounter: 52.6 kg (116 lb).  bp completed using cuff size: regular       Health Maintenance addressed:  NONE    n/a    Obdulio Shipman MA     "

## 2019-06-14 DIAGNOSIS — K50.919 CROHN'S DISEASE WITH COMPLICATION, UNSPECIFIED GASTROINTESTINAL TRACT LOCATION (H): ICD-10-CM

## 2019-06-14 NOTE — TELEPHONE ENCOUNTER
Prednisone       Last Written Prescription Date:  05/23/2019  Last Fill Quantity: 30,   # refills: 0  Last Office Visit: 05/23/2019  Future Office visit:       Routing refill request to provider for review/approval because:  Drug not on the [facility], [facility] or [facility] refill protocol or controlled substance  Requested Prescriptions   Pending Prescriptions Disp Refills     predniSONE (DELTASONE) 10 MG tablet [Pharmacy Med Name: PREDNISONE 10MG** TABLETS] 30 tablet 0     Sig: TAKE 1 TABLET(10 MG) BY MOUTH DAILY       There is no refill protocol information for this order

## 2019-06-19 RX ORDER — PREDNISONE 10 MG/1
TABLET ORAL
Start: 2019-06-19

## 2019-06-19 NOTE — TELEPHONE ENCOUNTER
Called pt   States when stopped Prednisone her pain returned  However now taking Advil and that manages her pain well   Denied Prednisone request  Latia BECERRIL RN

## 2019-06-20 ENCOUNTER — OFFICE VISIT (OUTPATIENT)
Dept: FAMILY MEDICINE | Facility: CLINIC | Age: 44
End: 2019-06-20
Payer: COMMERCIAL

## 2019-06-20 VITALS
RESPIRATION RATE: 14 BRPM | BODY MASS INDEX: 20.18 KG/M2 | DIASTOLIC BLOOD PRESSURE: 55 MMHG | HEART RATE: 92 BPM | HEIGHT: 64 IN | OXYGEN SATURATION: 100 % | WEIGHT: 118.19 LBS | SYSTOLIC BLOOD PRESSURE: 89 MMHG | TEMPERATURE: 98.3 F

## 2019-06-20 DIAGNOSIS — K50.919 CROHN'S DISEASE WITH COMPLICATION, UNSPECIFIED GASTROINTESTINAL TRACT LOCATION (H): ICD-10-CM

## 2019-06-20 LAB
BASOPHILS # BLD AUTO: 0 10E9/L (ref 0–0.2)
BASOPHILS NFR BLD AUTO: 0.4 %
DIFFERENTIAL METHOD BLD: ABNORMAL
EOSINOPHIL # BLD AUTO: 0.2 10E9/L (ref 0–0.7)
EOSINOPHIL NFR BLD AUTO: 2 %
ERYTHROCYTE [DISTWIDTH] IN BLOOD BY AUTOMATED COUNT: 14.2 % (ref 10–15)
ERYTHROCYTE [SEDIMENTATION RATE] IN BLOOD BY WESTERGREN METHOD: 42 MM/H (ref 0–20)
HCT VFR BLD AUTO: 32 % (ref 35–47)
HGB BLD-MCNC: 10.2 G/DL (ref 11.7–15.7)
LYMPHOCYTES # BLD AUTO: 0.9 10E9/L (ref 0.8–5.3)
LYMPHOCYTES NFR BLD AUTO: 9.6 %
MCH RBC QN AUTO: 29.9 PG (ref 26.5–33)
MCHC RBC AUTO-ENTMCNC: 31.9 G/DL (ref 31.5–36.5)
MCV RBC AUTO: 94 FL (ref 78–100)
MONOCYTES # BLD AUTO: 0.7 10E9/L (ref 0–1.3)
MONOCYTES NFR BLD AUTO: 7.9 %
NEUTROPHILS # BLD AUTO: 7.5 10E9/L (ref 1.6–8.3)
NEUTROPHILS NFR BLD AUTO: 80.1 %
PLATELET # BLD AUTO: 446 10E9/L (ref 150–450)
RBC # BLD AUTO: 3.41 10E12/L (ref 3.8–5.2)
WBC # BLD AUTO: 9.3 10E9/L (ref 4–11)

## 2019-06-20 PROCEDURE — 86140 C-REACTIVE PROTEIN: CPT | Performed by: FAMILY MEDICINE

## 2019-06-20 PROCEDURE — 99214 OFFICE O/P EST MOD 30 MIN: CPT | Performed by: FAMILY MEDICINE

## 2019-06-20 PROCEDURE — 80053 COMPREHEN METABOLIC PANEL: CPT | Performed by: FAMILY MEDICINE

## 2019-06-20 PROCEDURE — 85652 RBC SED RATE AUTOMATED: CPT | Performed by: FAMILY MEDICINE

## 2019-06-20 PROCEDURE — 85025 COMPLETE CBC W/AUTO DIFF WBC: CPT | Performed by: FAMILY MEDICINE

## 2019-06-20 PROCEDURE — 36415 COLL VENOUS BLD VENIPUNCTURE: CPT | Performed by: FAMILY MEDICINE

## 2019-06-20 RX ORDER — PREDNISONE 10 MG/1
10 TABLET ORAL 2 TIMES DAILY
Qty: 60 TABLET | Refills: 0 | Status: SHIPPED | OUTPATIENT
Start: 2019-06-20 | End: 2020-01-14

## 2019-06-20 ASSESSMENT — MIFFLIN-ST. JEOR: SCORE: 1176.09

## 2019-06-20 ASSESSMENT — PAIN SCALES - GENERAL: PAINLEVEL: NO PAIN (0)

## 2019-06-20 NOTE — PATIENT INSTRUCTIONS
Dr Clover Hernandez @ [place]  Dr Moya  Resume prednisone twice daily   Ok to take ibuprofen with meals only as needed    See Gastroenterology

## 2019-06-20 NOTE — PROGRESS NOTES
Subjective     Shaheen Flaherty is a 44 year old female who presents to clinic today for the following health issues:    HPI     Diagnosed with  crohn's disease at [facility]- colonoscopy & biopsy were consistent  (no av records)  She had a  Lot of abdomen cramps, diarrhea at the time and associated joint aches & pains.  Seen by Dr Heron mota- and was given prednisone- for a month.    Did not want to take steroid but eventaully did and felt healed immediately.  Declined long term treatment- stopped seeing GI (saw at least 2 more Gastroenterology) because each recommended long term medications & she did not want to.she focused on slef care, healthy eating, some eliminating cleansing diet but symptoms have kept recurring with diarrhea , moderate to severe joint stiffness and morning stiffness       She was advised long term treatment with  sulfasalazine which she did for a month & then stopped  She was advised amanda & she reports she has filled it but did not want to be on it .    Here at [facility] seen last about a month ago- felt better on prednisone 10 mg once daily, and best with id. As soon as stopped joint pain/ stifness & diarrhea have recurred.  Ibuprofen helps too.  She is crying because of symptoms and also because does not want to take long term medications     Appetite is ok, weight is stable          Review of Systems Gastroenterology as above   ROS COMP: CONSTITUTIONAL:NEGATIVE for fever, chills, change in weight and POSITIVE  for arthralgias, fatigue and myalgias  INTEGUMENTARY/SKIN: NEGATIVE for worrisome rashes, moles or lesions  EYES: NEGATIVE for vision changes or irritation  ENT/MOUTH: NEGATIVE for ear, mouth and throat problems  RESP:NEGATIVE for significant cough or SOB  CV: NEGATIVE for chest pain, palpitations or peripheral edema  GI: NEGATIVE for hematemesis and hematochezia  : normal menstrual cycles  MUSCULOSKELETAL: POSITIVE  for as in History of presenting illness   HEME/ALLERGY/IMMUNE:  "NEGATIVE for bleeding problems  PSYCHIATRIC: NEGATIVE for changes in mood or affect      Objective    BP (!) 89/55 (BP Location: Left arm, Patient Position: Sitting, Cuff Size: Adult Regular)   Pulse 92   Temp 98.3  F (36.8  C) (Oral)   Resp 14   Ht 1.626 m (5' 4\")   Wt 53.6 kg (118 lb 3 oz)   LMP 06/09/2019 (Approximate)   SpO2 100%   Breastfeeding? No   BMI 20.29 kg/m    Body mass index is 20.29 kg/m .  Physical Exam   GENERAL: healthy, alert and no distress  NECK: no adenopathy, no asymmetry, masses, or scars and thyroid normal to palpation  RESP: lungs clear to auscultation - no rales, rhonchi or wheezes  CV: regular rate and rhythm, normal S1 S2, no S3 or S4, no murmur, click or rub, no peripheral edema and peripheral pulses strong  ABDOMEN: soft, nontender, no hepatosplenomegaly, no masses and bowel sounds normal  MS: no gross musculoskeletal defects noted, no edema  PSYCH: mentation appears normal, affect normal/bright    Diagnostic Test Results:  Labs reviewed in Epic  Results for orders placed or performed in visit on 06/20/19 (from the past 24 hour(s))   ESR: Erythrocyte sedimentation rate   Result Value Ref Range    Sed Rate 42 (H) 0 - 20 mm/h           Assessment & Plan     1. Crohn's disease with complication, unspecified gastrointestinal tract location (H)  And arthritis   Plan: long discussion with patient  Pros & cons of long term medications, benefit at this time out weight the risk of long term treatment- including risk of colon cancer of untreated INFLAMMATORY BOWEL DISEASE    We discussed options and she is willing to proceed this time.  She is willing to see Gastroenterology   meanwhile symptomatic relief with prednisone  Plan:  - predniSONE (DELTASONE) 10 MG tablet; Take 1 tablet (10 mg) by mouth 2 times daily  Dispense: 60 tablet; Refill: 0  - GASTROENTEROLOGY ADULT REF CONSULT ONLY  - Comprehensive metabolic panel (BMP + Alb, Alk Phos, ALT, AST, Total. Bili, TP)  - ESR: Erythrocyte " sedimentation rate  - CRP, inflammation  - CBC with platelets differential     Potential medication side effects were discussed with the patient; let me know if any occur.    Return in about 4 weeks (around 7/18/2019) for concerns,unresolved.    Elaina Douglas MD  [facility]

## 2019-06-20 NOTE — RESULT ENCOUNTER NOTE
Slight anemia, low hemoglobin at 10.2 g, lower normal level is 11.7 gm  Please do start over the counter iron tab once daily with vitamin C or orange for better absorption.

## 2019-06-20 NOTE — RESULT ENCOUNTER NOTE
Pipo Jamison    I am sorry you are not feeling well,    ESR is high- a blood marker of chronic inflammation due to INFLAMMATORY BOWEL DISEASE  .    Dr Henri Herring , Dr aaron Pelaez are at [facility]- I have given you the phone numbers in consultation today.    Will keep you posted with rest of the labs.    Please keep us posted with questions or concerns .      Best Regards,    Elaina Douglas MD  [facility]  [phone]

## 2019-06-21 LAB
ALBUMIN SERPL-MCNC: 3.1 G/DL (ref 3.4–5)
ALP SERPL-CCNC: 60 U/L (ref 40–150)
ALT SERPL W P-5'-P-CCNC: 11 U/L (ref 0–50)
ANION GAP SERPL CALCULATED.3IONS-SCNC: 8 MMOL/L (ref 3–14)
AST SERPL W P-5'-P-CCNC: 12 U/L (ref 0–45)
BILIRUB SERPL-MCNC: 0.8 MG/DL (ref 0.2–1.3)
BUN SERPL-MCNC: 7 MG/DL (ref 7–30)
CALCIUM SERPL-MCNC: 8.2 MG/DL (ref 8.5–10.1)
CHLORIDE SERPL-SCNC: 105 MMOL/L (ref 94–109)
CO2 SERPL-SCNC: 25 MMOL/L (ref 20–32)
CREAT SERPL-MCNC: 0.54 MG/DL (ref 0.52–1.04)
CRP SERPL-MCNC: 88 MG/L (ref 0–8)
GFR SERPL CREATININE-BSD FRML MDRD: >90 ML/MIN/{1.73_M2}
GLUCOSE SERPL-MCNC: 85 MG/DL (ref 70–99)
POTASSIUM SERPL-SCNC: 3.9 MMOL/L (ref 3.4–5.3)
PROT SERPL-MCNC: 7.2 G/DL (ref 6.8–8.8)
SODIUM SERPL-SCNC: 138 MMOL/L (ref 133–144)

## 2019-07-11 ENCOUNTER — TRANSFERRED RECORDS (OUTPATIENT)
Dept: HEALTH INFORMATION MANAGEMENT | Facility: CLINIC | Age: 44
End: 2019-07-11

## 2019-07-11 LAB
ALT SERPL-CCNC: 5 IU/L (ref 0–32)
AST SERPL-CCNC: 9 IU/L (ref 0–40)
CREAT SERPL-MCNC: 0.56 MG/DL (ref 0.57–1)
GFR SERPL CREATININE-BSD FRML MDRD: 115 ML/MIN/1.73
GLUCOSE SERPL-MCNC: 102 MG/DL (ref 65–99)
POTASSIUM SERPL-SCNC: 4.7 MMOL/L (ref 3.5–5.2)

## 2019-07-22 ENCOUNTER — TRANSFERRED RECORDS (OUTPATIENT)
Dept: HEALTH INFORMATION MANAGEMENT | Facility: CLINIC | Age: 44
End: 2019-07-22

## 2019-08-14 ENCOUNTER — TRANSFERRED RECORDS (OUTPATIENT)
Dept: HEALTH INFORMATION MANAGEMENT | Facility: CLINIC | Age: 44
End: 2019-08-14

## 2019-10-04 ENCOUNTER — HEALTH MAINTENANCE LETTER (OUTPATIENT)
Age: 44
End: 2019-10-04

## 2019-11-18 ENCOUNTER — TELEPHONE (OUTPATIENT)
Dept: FAMILY MEDICINE | Facility: CLINIC | Age: 44
End: 2019-11-18

## 2019-11-18 NOTE — TELEPHONE ENCOUNTER
Spoke with patient.  States she thinks she has a UTI.    Last seen 6/2019.  Informed patient she will need to be seen.  Offered appointment with Dr. Watson tomorrow at 9:20 am (Pt. States she is leaving town at noon).  Patient states she will go to urgent care [facility].  If she changes her mind about appointment tomorrow morning she will call back.  Lucy Sandoval RN

## 2019-11-18 NOTE — TELEPHONE ENCOUNTER
Reason for call:  Patient reporting a symptom    Symptom or request: Poss UTI    Duration (how long have symptoms been present): 3 days     Have you been treated for this before? Yes    Additional comments: would like to be prescribed med for UTI by nurse over the phone    Phone Number patient can be reached at:  Home number on file [phone] (home)    Best Time:  Any    Can we leave a detailed message on this number:  YES    Call taken on 11/18/2019 at 3:35 PM by Lacey Zarate

## 2019-11-20 ENCOUNTER — TRANSFERRED RECORDS (OUTPATIENT)
Dept: HEALTH INFORMATION MANAGEMENT | Facility: CLINIC | Age: 44
End: 2019-11-20

## 2019-12-09 ENCOUNTER — OFFICE VISIT (OUTPATIENT)
Dept: PEDIATRICS | Facility: CLINIC | Age: 44
End: 2019-12-09
Payer: COMMERCIAL

## 2019-12-09 ENCOUNTER — TELEPHONE (OUTPATIENT)
Dept: PEDIATRICS | Facility: CLINIC | Age: 44
End: 2019-12-09

## 2019-12-09 VITALS
SYSTOLIC BLOOD PRESSURE: 97 MMHG | DIASTOLIC BLOOD PRESSURE: 59 MMHG | WEIGHT: 120.6 LBS | OXYGEN SATURATION: 100 % | TEMPERATURE: 98.3 F | HEART RATE: 60 BPM | BODY MASS INDEX: 20.7 KG/M2

## 2019-12-09 DIAGNOSIS — N39.0 RECURRENT UTI: Primary | ICD-10-CM

## 2019-12-09 DIAGNOSIS — R30.0 DYSURIA: ICD-10-CM

## 2019-12-09 LAB
ALBUMIN UR-MCNC: NEGATIVE MG/DL
APPEARANCE UR: CLEAR
BILIRUB UR QL STRIP: NEGATIVE
COLOR UR AUTO: ABNORMAL
GLUCOSE UR STRIP-MCNC: NEGATIVE MG/DL
HGB UR QL STRIP: ABNORMAL
KETONES UR STRIP-MCNC: NEGATIVE MG/DL
LEUKOCYTE ESTERASE UR QL STRIP: NEGATIVE
NITRATE UR QL: NEGATIVE
NON-SQ EPI CELLS #/AREA URNS LPF: ABNORMAL /LPF
PH UR STRIP: 7.5 PH (ref 5–7)
RBC #/AREA URNS AUTO: ABNORMAL /HPF
SOURCE: ABNORMAL
SP GR UR STRIP: 1 (ref 1–1.03)
UROBILINOGEN UR STRIP-MCNC: NORMAL MG/DL (ref 0–2)
WBC #/AREA URNS AUTO: ABNORMAL /HPF

## 2019-12-09 PROCEDURE — 87088 URINE BACTERIA CULTURE: CPT | Performed by: FAMILY MEDICINE

## 2019-12-09 PROCEDURE — 99213 OFFICE O/P EST LOW 20 MIN: CPT | Performed by: FAMILY MEDICINE

## 2019-12-09 PROCEDURE — 81001 URINALYSIS AUTO W/SCOPE: CPT | Performed by: FAMILY MEDICINE

## 2019-12-09 PROCEDURE — 87086 URINE CULTURE/COLONY COUNT: CPT | Performed by: FAMILY MEDICINE

## 2019-12-09 NOTE — TELEPHONE ENCOUNTER
[facility] Call Center    Phone Message    May a detailed message be left on voicemail: yes    Reason for Call: Other: Patient calling to give list of prescriptions she currently takes. fluconazold 15 mg 2xs a days, bactrim double strength, Nitrofurantoin 1 tablet . please advise     Action Taken: Message routed to:  Primary Care p [phone]

## 2019-12-09 NOTE — TELEPHONE ENCOUNTER
Called patient back.  Gave her the message from Dr. Alfonso.      She would like to wait until the cultures are final.  Will let Dr. Alfonso know.    She also said her last physical was last December of last year. Helped her schedule with Dr. Alfonso.  Next 5 appointments (look out 90 days)    Jan 14, 2020 10:50 AM CST  PHYSICAL with Franklin Alfonso MD  [facility] [address] [place] [id number]  [phone]        Lesia Chilel RN, [facility]

## 2019-12-09 NOTE — TELEPHONE ENCOUNTER
Dear Triage,  Is this message for me of Dr Alfonso   Please review and let me know what is needed from me.  Change primary care physicain - seems like she has an up coming physical at   Thanks

## 2019-12-09 NOTE — TELEPHONE ENCOUNTER
Please inform patient-since she reported no therapeutic response to the aforementioned antibiotics and, given her allergy to penicillin, ciprofloxacin still seem to be the drug of choice at this time unless patient wants to wait for the urine culture, begin follow-up in 2 to 3 days when the results are back for antibiotic recommendations.

## 2019-12-09 NOTE — RESULT ENCOUNTER NOTE
Dear Shaheen,  Your urine exam showed few pus cells.  As we discussed, let us wait for the urine culture for further recommendations.  I am also waiting to hear from you regarding the antibiotics that you used in the past.  Let me know if you have any questions. Take care.  Franklin Alfonso MD

## 2019-12-09 NOTE — TELEPHONE ENCOUNTER
Called patient, left message with RN phone number to call back.       Lesia Chilel RN, [facility]

## 2019-12-09 NOTE — TELEPHONE ENCOUNTER
Called patient to clarify.  Medications she has tried has been Nitrofurantoin, fluconazole, and Bactrim DS.  She states she wanted to let Dr. Alfonso know what has already been tried.    Routing to Dr. Alfonso to review.    Lesia Chilel RN, [facility]

## 2019-12-09 NOTE — PROGRESS NOTES
Subjective     Shaheen Flaherty is a 44 year old female who presents to clinic today for the following health issues:    HPI   URINARY TRACT SYMPTOMS    Patient is new to the provider, is here t with concerns of having lower abdominal pain, burning urination and frequency of urination with nauseated and presents with hematuria, urinary urgency, flank or low back pain, nausea, vomiting, fever, chills for the past 1 to 2 days.  Has history of recurrent UTI, had 3 episodes in the past 3 to 4 months  Patient does not think whatever antibiotic stay she had tried was helpful.  Patient reported using urgent care in [place] for the previous episodes of cystitis.  Patient is sexually active, LMP-today  Past medical history significant for Crohn's disease, on Humira    Onset: 1 day    Description:   Painful urination (Dysuria): YES           Frequency: YES  Blood in urine (Hematuria): no   Delay in urine (Hesitency): no     Intensity: mild    Progression of Symptoms:  same    Accompanying Signs & Symptoms:  Fever/chills: no   Flank pain no   Nausea and vomiting: no   Any vaginal symptoms: none  Abdominal/Pelvic Pain: YES    History:   History of frequent UTI's: YES  History of kidney stones: no   Sexually Active: YES  Possibility of pregnancy: No    Precipitating factors:       Therapies Tried and outcome: Cranberry juice prn (contraindicated in Coumadin patients)      Patient Active Problem List   Diagnosis     Spinal stenosis in cervical region     Crohn's disease with complication, unspecified gastrointestinal tract location (H)     Past Surgical History:   Procedure Laterality Date     ENT SURGERY      oral surgery as a child     FUSION CERVICAL ANTERIOR TWO LEVELS  1/14/2013    Procedure: FUSION CERVICAL ANTERIOR TWO LEVELS;  C5-6 AND C6-7 ANTERIOR DISCECTOMY AND FUSION WITH MIRTA ANTERIOR INSTRUMENTATION (C-ARM, MIDAS, CONTRAVES) ;  Surgeon: Vince Frederick MD;  Location:  OR       Social History     Tobacco  Use     Smoking status: Never Smoker     Smokeless tobacco: Never Used   Substance Use Topics     Alcohol use: Yes     Alcohol/week: 0.0 standard drinks     Comment: 2 wine daily     Family History   Problem Relation Age of Onset     Breast Cancer Maternal Aunt         Pt's mother tested and negative     Uterine Cancer Maternal Grandmother          Current Outpatient Medications   Medication Sig Dispense Refill     IRON PO        Omega-3 Fatty Acids (OMEGA 3 PO)        predniSONE (DELTASONE) 10 MG tablet Take 1 tablet (10 mg) by mouth 2 times daily 60 tablet 0     predniSONE (DELTASONE) 20 MG tablet Take 3 tabs (60 mg) by mouth daily x 3 days, 2 tabs (40 mg) daily x 3 days, 1 tab (20 mg) daily x 3 days, then 1/2 tab (10 mg) x 3 days. 20 tablet 0     valACYclovir (VALTREX) 500 MG tablet Take one tab by mouth twice daily for 3 days. Start at first onset of symptoms 6 tablet 6     VITAMIN D, CHOLECALCIFEROL, PO Take by mouth daily       Allergies   Allergen Reactions     Penicillin V Hives     Recent Labs   Lab Test 07/11/19 06/20/19  1610 06/25/18  1620 03/06/18  1238   ALT 5 11 13  --    CR 0.56* 0.54 0.61  --    GFRESTIMATED 115 >90 >90  --    GFRESTBLACK 132 >90 >90  --    POTASSIUM 4.7 3.9 4.2  --    TSH  --   --   --  1.46      BP Readings from Last 3 Encounters:   12/09/19 97/59   06/20/19 (!) 89/55   05/23/19 90/60    Wt Readings from Last 3 Encounters:   12/09/19 54.7 kg (120 lb 9.6 oz)   06/20/19 53.6 kg (118 lb 3 oz)   05/23/19 52.6 kg (116 lb)                      Reviewed and updated as needed this visit by Provider         Review of Systems   ROS COMP: CONSTITUTIONAL: NEGATIVE for fever, chills, change in weight  RESP: NEGATIVE for significant cough or SOB  CV: NEGATIVE for chest pain, palpitations or peripheral edema  GI: as above  : as above  MUSCULOSKELETAL: NEGATIVE for significant arthralgias or myalgia  PSYCHIATRIC: NEGATIVE for changes in mood or affect      Objective    BP 97/59 (BP Location:  Right arm, Patient Position: Sitting, Cuff Size: Adult Regular)   Pulse 60   Temp 98.3  F (36.8  C) (Oral)   Wt 54.7 kg (120 lb 9.6 oz)   SpO2 100%   BMI 20.70 kg/m    Body mass index is 20.7 kg/m .  Physical Exam   GENERAL: healthy, alert and no distress  RESP: lungs clear to auscultation - no rales, rhonchi or wheezes  CV: regular rate and rhythm, normal S1 S2, no S3 or S4, no murmur, click or rub, no peripheral edema and peripheral pulses strong  ABDOMEN: soft, nontender, no hepatosplenomegaly, no masses and bowel sounds normal  MS: no gross musculoskeletal defects noted, no edema  SKIN: no suspicious lesions or rashes  BACK: no CVA tenderness, no paralumbar tenderness  PSYCH: mentation appears normal, affect normal/bright    Diagnostic Test Results:  Labs reviewed in Epic        Assessment & Plan     1. Recurrent UTI  Results for orders placed or performed in visit on 12/09/19   UA with Microscopic ([place]; [facility])     Status: Abnormal   Result Value Ref Range    Color Urine Straw     Appearance Urine Clear     Glucose Urine Negative NEG^Negative mg/dL    Bilirubin Urine Negative NEG^Negative    Ketones Urine Negative NEG^Negative mg/dL    Specific Gravity Urine 1.002 (L) 1.003 - 1.035    Blood Urine Moderate (A) NEG^Negative    pH Urine 7.5 (H) 5.0 - 7.0 pH    Protein Albumin Urine Negative NEG^Negative mg/dL    Urobilinogen mg/dL Normal 0.0 - 2.0 mg/dL    Nitrite Urine Negative NEG^Negative    Leukocyte Esterase Urine Negative NEG^Negative    Source Midstream Urine     WBC Urine 5-10 (A) OTO5^0 - 5 /HPF    RBC Urine 2-5 (A) OTO2^O - 2 /HPF    Squamous Epithelial /LPF Urine Few FEW^Few /LPF     Reviewed urine exam results, showing few blood cells and WBC likely from current menstruation  Will wait for urine culture for further recommendations  Patient called the clinic later with the trial of antibiotics that she used including Bactrim, Macrobid that did not help  Consider giving a trial of  3 days of Cipro for possible cystitis, patient is reluctant to try antibiotics due to potential side effects given her underlying Crohn's disease  Will f/u on UC results and call with recommendations.    - Urine Culture Aerobic Bacterial  - UA with Microscopic ([facility])    2. Dysuria  as above    - Urine Culture Aerobic Bacterial  - UA with Microscopic ([facility])       Patient also asked if she can get a physical today, patient was recommended to schedule physical at a later date, she felt she had a pelvic and breast exam at [facility], she will check back and let us know if she is due for ONE  See Patient Instructions    No follow-ups on file.    Franklin Alfonso MD  [facility]  Chart documentation done in part with Dragon Voice recognition Software. Although reviewed after completion, some word and grammatical error may remain.

## 2019-12-11 ENCOUNTER — TELEPHONE (OUTPATIENT)
Dept: PEDIATRICS | Facility: CLINIC | Age: 44
End: 2019-12-11

## 2019-12-11 DIAGNOSIS — N39.0 URINARY TRACT INFECTION WITHOUT HEMATURIA, SITE UNSPECIFIED: Primary | ICD-10-CM

## 2019-12-11 LAB
BACTERIA SPEC CULT: ABNORMAL
BACTERIA SPEC CULT: ABNORMAL
SPECIMEN SOURCE: ABNORMAL

## 2019-12-11 RX ORDER — CEPHALEXIN 500 MG/1
500 CAPSULE ORAL 3 TIMES DAILY
Qty: 30 CAPSULE | Refills: 0 | Status: SHIPPED | OUTPATIENT
Start: 2019-12-11 | End: 2020-01-14

## 2019-12-11 NOTE — TELEPHONE ENCOUNTER
Detailed message left for patient with results/provider recommendation.  Advised if any sign of allergic reaction to Cephalexin to stop antibiotic and contact the clinic.    Lidia Titus RN

## 2019-12-11 NOTE — TELEPHONE ENCOUNTER
[facility] Call Center    Phone Message    May a detailed message be left on voicemail: yes    Reason for Call: Requesting Results   Name/type of test: Urine Culture  Date of test: 12/9/19     Patient called for results.  She also stated that she was supposed to be prescribed medication prior to the final results.  Please advise.  Thank you.     Preferred pharmacy: [facility] on [address] in [place].      Action Taken: Message routed to:  Primary Care p [phone]

## 2019-12-11 NOTE — TELEPHONE ENCOUNTER
Please inform Shaheen-her culture did not show any concerning growth of bacteria  The presence of Streptococcus is part of her normal santiago  Sometimes patients will be treated for this if they have recurrent UTI symptoms as Shaheen is having now.  Due to allergic to penicillin we cannot give her ampicillin, amoxicillin  We can give a trial of cephalexin 3 times a day for the next 10 days  These have possibility for cross sensitivity with penicillin, but she could still give a trial if she develops any hives or itching with this new antibiotic, we will stop  Refill is sent.

## 2019-12-11 NOTE — TELEPHONE ENCOUNTER
Routing to Dr. Alfonso for review of results and recommendation.  Culture grew mixed asntiago, but also showed Streptococcus agalactiae sero group B.    Lidia Titus RN

## 2020-01-03 DIAGNOSIS — Z00.00 ROUTINE GENERAL MEDICAL EXAMINATION AT A HEALTH CARE FACILITY: Primary | ICD-10-CM

## 2020-01-03 DIAGNOSIS — Z13.1 SCREENING FOR DIABETES MELLITUS (DM): ICD-10-CM

## 2020-01-03 DIAGNOSIS — Z13.0 SCREENING FOR DEFICIENCY ANEMIA: ICD-10-CM

## 2020-01-03 DIAGNOSIS — Z13.6 CARDIOVASCULAR SCREENING; LDL GOAL LESS THAN 160: ICD-10-CM

## 2020-01-14 ENCOUNTER — OFFICE VISIT (OUTPATIENT)
Dept: PEDIATRICS | Facility: CLINIC | Age: 45
End: 2020-01-14
Payer: COMMERCIAL

## 2020-01-14 VITALS
TEMPERATURE: 98.3 F | SYSTOLIC BLOOD PRESSURE: 78 MMHG | BODY MASS INDEX: 19.58 KG/M2 | HEIGHT: 64 IN | OXYGEN SATURATION: 99 % | HEART RATE: 81 BPM | WEIGHT: 114.7 LBS | DIASTOLIC BLOOD PRESSURE: 56 MMHG

## 2020-01-14 DIAGNOSIS — K50.919 CROHN'S DISEASE WITH COMPLICATION, UNSPECIFIED GASTROINTESTINAL TRACT LOCATION (H): ICD-10-CM

## 2020-01-14 DIAGNOSIS — Z23 NEED FOR TDAP VACCINATION: ICD-10-CM

## 2020-01-14 DIAGNOSIS — Z13.0 SCREENING FOR DEFICIENCY ANEMIA: ICD-10-CM

## 2020-01-14 DIAGNOSIS — Z23 NEED FOR INFLUENZA VACCINATION: ICD-10-CM

## 2020-01-14 DIAGNOSIS — Z12.39 BREAST CANCER SCREENING: ICD-10-CM

## 2020-01-14 DIAGNOSIS — N39.0 RECURRENT UTI: ICD-10-CM

## 2020-01-14 DIAGNOSIS — Z00.00 ROUTINE GENERAL MEDICAL EXAMINATION AT A HEALTH CARE FACILITY: Primary | ICD-10-CM

## 2020-01-14 DIAGNOSIS — B00.9 HSV INFECTION: ICD-10-CM

## 2020-01-14 DIAGNOSIS — Z13.1 SCREENING FOR DIABETES MELLITUS (DM): ICD-10-CM

## 2020-01-14 DIAGNOSIS — Z12.4 CERVICAL CANCER SCREENING: ICD-10-CM

## 2020-01-14 DIAGNOSIS — Z11.4 SCREENING FOR HUMAN IMMUNODEFICIENCY VIRUS: ICD-10-CM

## 2020-01-14 DIAGNOSIS — Z13.6 CARDIOVASCULAR SCREENING; LDL GOAL LESS THAN 160: ICD-10-CM

## 2020-01-14 LAB
ALBUMIN UR-MCNC: NEGATIVE MG/DL
ANION GAP SERPL CALCULATED.3IONS-SCNC: 3 MMOL/L (ref 3–14)
APPEARANCE UR: CLEAR
BACTERIA #/AREA URNS HPF: ABNORMAL /HPF
BASOPHILS # BLD AUTO: 0.1 10E9/L (ref 0–0.2)
BASOPHILS NFR BLD AUTO: 1.3 %
BILIRUB UR QL STRIP: NEGATIVE
BUN SERPL-MCNC: 7 MG/DL (ref 7–30)
CALCIUM SERPL-MCNC: 9.1 MG/DL (ref 8.5–10.1)
CHLORIDE SERPL-SCNC: 105 MMOL/L (ref 94–109)
CHOLEST SERPL-MCNC: 156 MG/DL
CO2 SERPL-SCNC: 29 MMOL/L (ref 20–32)
COLOR UR AUTO: ABNORMAL
CREAT SERPL-MCNC: 0.62 MG/DL (ref 0.52–1.04)
DIFFERENTIAL METHOD BLD: ABNORMAL
EOSINOPHIL # BLD AUTO: 0.1 10E9/L (ref 0–0.7)
EOSINOPHIL NFR BLD AUTO: 1.3 %
ERYTHROCYTE [DISTWIDTH] IN BLOOD BY AUTOMATED COUNT: 13.8 % (ref 10–15)
GFR SERPL CREATININE-BSD FRML MDRD: >90 ML/MIN/{1.73_M2}
GLUCOSE SERPL-MCNC: 81 MG/DL (ref 70–99)
GLUCOSE UR STRIP-MCNC: NEGATIVE MG/DL
HCT VFR BLD AUTO: 32.4 % (ref 35–47)
HDLC SERPL-MCNC: 82 MG/DL
HGB BLD-MCNC: 10.7 G/DL (ref 11.7–15.7)
HGB UR QL STRIP: ABNORMAL
IMM GRANULOCYTES # BLD: 0 10E9/L (ref 0–0.4)
IMM GRANULOCYTES NFR BLD: 0.2 %
KETONES UR STRIP-MCNC: 5 MG/DL
LDLC SERPL CALC-MCNC: 64 MG/DL
LEUKOCYTE ESTERASE UR QL STRIP: ABNORMAL
LYMPHOCYTES # BLD AUTO: 1.8 10E9/L (ref 0.8–5.3)
LYMPHOCYTES NFR BLD AUTO: 29.1 %
MCH RBC QN AUTO: 31.8 PG (ref 26.5–33)
MCHC RBC AUTO-ENTMCNC: 33 G/DL (ref 31.5–36.5)
MCV RBC AUTO: 96 FL (ref 78–100)
MONOCYTES # BLD AUTO: 0.5 10E9/L (ref 0–1.3)
MONOCYTES NFR BLD AUTO: 7.9 %
NEUTROPHILS # BLD AUTO: 3.7 10E9/L (ref 1.6–8.3)
NEUTROPHILS NFR BLD AUTO: 60.2 %
NITRATE UR QL: NEGATIVE
NON-SQ EPI CELLS #/AREA URNS LPF: ABNORMAL /LPF
NONHDLC SERPL-MCNC: 74 MG/DL
PH UR STRIP: 6.5 PH (ref 5–7)
PLATELET # BLD AUTO: 272 10E9/L (ref 150–450)
POTASSIUM SERPL-SCNC: 3.8 MMOL/L (ref 3.4–5.3)
RBC # BLD AUTO: 3.36 10E12/L (ref 3.8–5.2)
RBC #/AREA URNS AUTO: ABNORMAL /HPF
SODIUM SERPL-SCNC: 137 MMOL/L (ref 133–144)
SOURCE: ABNORMAL
SP GR UR STRIP: 1 (ref 1–1.03)
TRIGL SERPL-MCNC: 51 MG/DL
UROBILINOGEN UR STRIP-MCNC: NORMAL MG/DL (ref 0–2)
WBC # BLD AUTO: 6.2 10E9/L (ref 4–11)
WBC #/AREA URNS AUTO: >100 /HPF

## 2020-01-14 PROCEDURE — 99396 PREV VISIT EST AGE 40-64: CPT | Performed by: FAMILY MEDICINE

## 2020-01-14 PROCEDURE — 80061 LIPID PANEL: CPT | Performed by: FAMILY MEDICINE

## 2020-01-14 PROCEDURE — 87109 MYCOPLASMA: CPT | Performed by: FAMILY MEDICINE

## 2020-01-14 PROCEDURE — 81001 URINALYSIS AUTO W/SCOPE: CPT | Performed by: FAMILY MEDICINE

## 2020-01-14 PROCEDURE — 80048 BASIC METABOLIC PNL TOTAL CA: CPT | Performed by: FAMILY MEDICINE

## 2020-01-14 PROCEDURE — 87186 SC STD MICRODIL/AGAR DIL: CPT | Performed by: FAMILY MEDICINE

## 2020-01-14 PROCEDURE — 36415 COLL VENOUS BLD VENIPUNCTURE: CPT | Performed by: FAMILY MEDICINE

## 2020-01-14 PROCEDURE — 99213 OFFICE O/P EST LOW 20 MIN: CPT | Mod: 25 | Performed by: FAMILY MEDICINE

## 2020-01-14 PROCEDURE — 87389 HIV-1 AG W/HIV-1&-2 AB AG IA: CPT | Performed by: FAMILY MEDICINE

## 2020-01-14 PROCEDURE — 87088 URINE BACTERIA CULTURE: CPT | Performed by: FAMILY MEDICINE

## 2020-01-14 PROCEDURE — 85025 COMPLETE CBC W/AUTO DIFF WBC: CPT | Performed by: FAMILY MEDICINE

## 2020-01-14 PROCEDURE — 87086 URINE CULTURE/COLONY COUNT: CPT | Performed by: FAMILY MEDICINE

## 2020-01-14 RX ORDER — VALACYCLOVIR HYDROCHLORIDE 500 MG/1
TABLET, FILM COATED ORAL
Qty: 6 TABLET | Refills: 6 | Status: SHIPPED | OUTPATIENT
Start: 2020-01-14 | End: 2021-01-18

## 2020-01-14 ASSESSMENT — MIFFLIN-ST. JEOR: SCORE: 1164.25

## 2020-01-14 ASSESSMENT — PAIN SCALES - GENERAL: PAINLEVEL: NO PAIN (0)

## 2020-01-14 NOTE — RESULT ENCOUNTER NOTE
Dear Shaheen,  Urine exam showed possible infection.  I would like to wait for the urine culture results before suggesting antibiotic treatment.   Let me know if you have any questions. Take care.  Franklin Alfonso MD

## 2020-01-14 NOTE — PROGRESS NOTES
SUBJECTIVE:   CC: Shaheen Flaherty is an 44 year old woman who presents for preventive health visit.     Healthy Habits: Patient is here for annual physical and with other concerns as mentioned below    RECURRENT UTI-complaining of frequent episodes of bladder infections, at least 5-6 episodes in the past 6 months,  Patient feels partially improved with antibiotic treatment but continues to have ongoing severe burning urination and urinary urgency  She feels like she has been taking as almost on a daily basis for the past few weeks  LMP-10 days ago  Patient is nulliparous, sexually active with her   Noted severe pain during sex when she has the severe urinary symptoms  Patient denies pain during menstruation, abnormal vaginal bleeding, discharge, history of PID.  She denies low back or flank pain, nausea, vomiting, fever, chills.  Past medical history significant for Crohn's disease  Patient was on Humira, the last dose was 2 months ago.  Patient feels her symptoms started when she started taking Humira 6 months ago and after communicating with her gastroenterologist who wanted her to stop the Humira due to her well-controlled GI symptoms  Patient denies current concerns for diarrhea but has intermittent episodes of constipation , but not currently  Patient denies hematuria, history of renal stones in the past      Do you get at least three servings of calcium containing foods daily (dairy, green leafy vegetables, etc.)? yes    Amount of exercise or daily activities, outside of work: 2-3 day(s) per week    Problems taking medications regularly No    Medication side effects: No    Have you had an eye exam in the past two years? no    Do you see a dentist twice per year? yes    Do you have sleep apnea, excessive snoring or daytime drowsiness?no    QUESTIONS/ CONCERNS: Recurring UTI. Patient reports taking Azos at 3am this morning. Patient report pain with intercourse. Patient reports abdominal cramping and  urinary urgency. Patient started on Humira about 2 months ago and noticed symptoms since then. Stopped Humira mid-Nov. Patient has been on Fluconazole 50, Bactrim, Nitrofurantoin  for UTI and only gets temporary relief.     Flu Vaccine - offered, patient declined        Today's PHQ-2 Score:   PHQ-2 ( 1999 Pfizer) 1/14/2020 5/23/2019   Q1: Little interest or pleasure in doing things 0 0   Q2: Feeling down, depressed or hopeless 0 0   PHQ-2 Score 0 0   PHQ-2 Score - -     Abuse: Current or Past(Physical, Sexual or Emotional)- No  Do you feel safe in your environment? Yes        Social History     Tobacco Use     Smoking status: Never Smoker     Smokeless tobacco: Never Used   Substance Use Topics     Alcohol use: Yes     Alcohol/week: 0.0 standard drinks     Comment: 2 wine daily     If you drink alcohol do you typically have >3 drinks per day or >7 drinks per week? YES                     Reviewed orders with patient.  Reviewed health maintenance and updated orders accordingly - Yes  Lab work is in process  Labs reviewed in EPIC  BP Readings from Last 3 Encounters:   01/14/20 (!) 78/56   12/09/19 97/59   06/20/19 (!) 89/55    Wt Readings from Last 3 Encounters:   01/14/20 52 kg (114 lb 11.2 oz)   12/09/19 54.7 kg (120 lb 9.6 oz)   06/20/19 53.6 kg (118 lb 3 oz)                  Patient Active Problem List   Diagnosis     Spinal stenosis in cervical region     Crohn's disease with complication, unspecified gastrointestinal tract location (H)     Past Surgical History:   Procedure Laterality Date     ENT SURGERY      oral surgery as a child     FUSION CERVICAL ANTERIOR TWO LEVELS  1/14/2013    Procedure: FUSION CERVICAL ANTERIOR TWO LEVELS;  C5-6 AND C6-7 ANTERIOR DISCECTOMY AND FUSION WITH MIRTA ANTERIOR INSTRUMENTATION (C-ARM, MIDAS, CONTRAVES) ;  Surgeon: Vince Frederick MD;  Location:  OR       Social History     Tobacco Use     Smoking status: Never Smoker     Smokeless tobacco: Never Used   Substance Use  Topics     Alcohol use: Yes     Alcohol/week: 0.0 standard drinks     Comment: 2 wine daily     Family History   Problem Relation Age of Onset     Breast Cancer Maternal Aunt         Pt's mother tested and negative     Uterine Cancer Maternal Grandmother          Current Outpatient Medications   Medication Sig Dispense Refill     FOLIC ACID PO Take 1 tablet by mouth daily       Probiotic Product (PROBIOTIC DAILY PO) Take 1 tablet by mouth daily       valACYclovir (VALTREX) 500 MG tablet Take one tab by mouth twice daily for 3 days. Start at first onset of symptoms 6 tablet 6     IRON PO        Omega-3 Fatty Acids (OMEGA 3 PO)        VITAMIN D, CHOLECALCIFEROL, PO Take by mouth daily       Allergies   Allergen Reactions     Penicillin V Hives     Recent Labs   Lab Test 07/11/19 06/20/19  1610 06/25/18  1620 03/06/18  1238   ALT 5 11 13  --    CR 0.56* 0.54 0.61  --    GFRESTIMATED 115 >90 >90  --    GFRESTBLACK 132 >90 >90  --    POTASSIUM 4.7 3.9 4.2  --    TSH  --   --   --  1.46        Mammogram Screening: Patient under age 50, mutual decision reflected in health maintenance.      Pertinent mammograms are reviewed under the imaging tab.  History of abnormal Pap smear: NO - age 30-65 PAP every 5 years with negative HPV co-testing recommended  PAP / HPV Latest Ref Rng & Units 3/6/2018 2/13/2015   PAP - NIL -   HPV 16 DNA NEG:Negative Negative -   HPV 18 DNA NEG:Negative Negative -   OTHER HR HPV NEG:Negative Negative -   HPV-ABSTRA - - Negative     Reviewed and updated as needed this visit by clinical staff  Tobacco  Allergies  Meds  Med Hx  Surg Hx  Fam Hx  Soc Hx        Reviewed and updated as needed this visit by Provider          Past Medical History:   Diagnosis Date     Cervical disc herniation      Condyloma      Genital herpes       Past Surgical History:   Procedure Laterality Date     ENT SURGERY      oral surgery as a child     FUSION CERVICAL ANTERIOR TWO LEVELS  1/14/2013    Procedure: FUSION  "CERVICAL ANTERIOR TWO LEVELS;  C5-6 AND C6-7 ANTERIOR DISCECTOMY AND FUSION WITH MIRTA ANTERIOR INSTRUMENTATION (C-ARM, MIDAS, CONTRAVES) ;  Surgeon: Vince Frederick MD;  Location: [facility] OR     OB History    Para Term  AB Living   1 0 0 0 1 0   SAB TAB Ectopic Multiple Live Births   0 0 0 0 0      # Outcome Date GA Lbr Luis E/2nd Weight Sex Delivery Anes PTL Lv   1 AB                ROS:  CONSTITUTIONAL: NEGATIVE for fever, chills, change in weight  INTEGUMENTARU/SKIN: NEGATIVE for worrisome rashes, moles or lesions  EYES: NEGATIVE for vision changes or irritation  ENT: NEGATIVE for ear, mouth and throat problems  RESP: NEGATIVE for significant cough or SOB  BREAST: NEGATIVE for masses, tenderness or discharge  CV: NEGATIVE for chest pain, palpitations or peripheral edema  GI: History of Crohn's disease    female: as above  MUSCULOSKELETAL: NEGATIVE for significant arthralgias or myalgia  NEURO: NEGATIVE for weakness, dizziness or paresthesias  PSYCHIATRIC: NEGATIVE for changes in mood or affect    OBJECTIVE:   BP (!) 78/56 (BP Location: Right arm, Patient Position: Sitting, Cuff Size: Adult Regular)   Pulse 81   Temp 98.3  F (36.8  C) (Oral)   Ht 1.632 m (5' 4.25\")   Wt 52 kg (114 lb 11.2 oz)   LMP 2019 (Exact Date)   SpO2 99%   BMI 19.54 kg/m    EXAM:  GENERAL: healthy, alert and no distress  EYES: Eyes grossly normal to inspection, PERRL and conjunctivae and sclerae normal  HENT: ear canals and TM's normal, nose and mouth without ulcers or lesions  NECK: no adenopathy, no asymmetry, masses, or scars and thyroid normal to palpation  RESP: lungs clear to auscultation - no rales, rhonchi or wheezes  BREAST: normal without masses, tenderness or nipple discharge and no palpable axillary masses or adenopathy  CV: regular rate and rhythm, normal S1 S2, no S3 or S4, no murmur, click or rub, no peripheral edema and peripheral pulses strong  ABDOMEN: soft, moderate suprapubic tenderness with " no guarding or rigidity, no organomegaly, normal BS, no costovertebral angle tenderness   (female): normal female external genitalia, normal urethral meatus , vaginal mucosa pink, moist, well rugated and normal cervix, adnexae, and uterus without masses.  MS: no gross musculoskeletal defects noted, no edema  SKIN: no suspicious lesions or rashes  NEURO: Normal strength and tone, mentation intact and speech normal  PSYCH: mentation appears normal, affect normal/bright    Diagnostic Test Results:  Labs reviewed in Epic    ASSESSMENT/PLAN:   1. Routine general medical examination at a health care facility  Discussed on regular exercises, daily calcium intake, healthy eating, self breast exams monthly and routine dental checks    - Lipid panel reflex to direct LDL Fasting  - **Basic metabolic panel FUTURE anytime  - CBC with platelets differential  - MA Screening Digital Bilateral; Future  - HIV Antigen Antibody Combo  - TDAP VACCINE; Future    2. Recurrent UTI  Patient reported researching on the Internet and found information on interstitial cystitis.  She feels that is what she could be having causing her reoccurring symptoms  Patient was seen last month for possible UTI, had a urine culture that was negative  Review the Etiopathology of interstitial cystitis, nature of diagnosis and prognosis and foods affecting the symptoms  Patient drinks alcohol 4-5 times a week 1 to 2 glasses of wine  Emphasized on avoiding alcohol, caffeinated drinks  We will get a urine exam, urine culture and Ureaplasma culture due to previous negative cultures with urinary symptoms  Will f/u on results and call with recommendations.  Recommended urology consult for further evaluation  Recommended to push fluids  - *UA reflex to Microscopic and Culture ([place]; [facility]; [facility]; [facility]; [facility]; [place]; [place])  - Urine Culture Aerobic Bacterial  - Ureaplasma culture  - UROLOGY ADULT  "REFERRAL    3. CARDIOVASCULAR SCREENING; LDL GOAL LESS THAN 160    - Lipid panel reflex to direct LDL Fasting    4. Screening for diabetes mellitus (DM)    - **Basic metabolic panel FUTURE anytime    5. Screening for deficiency anemia    - CBC with platelets differential    6. Breast cancer screening    - MA Screening Digital Bilateral; Future    7. HSV infection  Cold sores  - valACYclovir (VALTREX) 500 MG tablet; Take one tab by mouth twice daily for 3 days. Start at first onset of symptoms  Dispense: 6 tablet; Refill: 6    8. Screening for human immunodeficiency virus    - HIV Antigen Antibody Combo    9. Crohn's disease with complication, unspecified gastrointestinal tract location (H)  Patient sees  at [facility]    10. Need for influenza vaccination  Patient declined    11. Need for Tdap vaccination  Patient declined a Tdap shot today but wants to have it done when she comes back for her mammogram  - TDAP VACCINE; Future    12. Cervical cancer screening  Reviewed normal Pap in 2018, recheck in 2023      COUNSELING:   Reviewed preventive health counseling, as reflected in patient instructions  Special attention given to:        Regular exercise       Healthy diet/nutrition       Vision screening  HIV screening       Immunizations    Declined: Influenza and TDAP due to Other                Contraception       Family planning       Folic Acid Counseling       Osteoporosis Prevention/Bone Health       The ASCVD Risk score ([place] DC Jr., et al., 2013) failed to calculate for the following reasons:    The valid systolic blood pressure range is 90 to 200 mmHg    Cannot find a previous HDL lab    Cannot find a previous total cholesterol lab    Estimated body mass index is 19.54 kg/m  as calculated from the following:    Height as of this encounter: 1.632 m (5' 4.25\").    Weight as of this encounter: 52 kg (114 lb 11.2 oz).         reports that she has never smoked. She has never used smokeless " tobacco.      Counseling Resources:  ATP IV Guidelines  Pooled Cohorts Equation Calculator  Breast Cancer Risk Calculator  FRAX Risk Assessment  ICSI Preventive Guidelines  Dietary Guidelines for Americans, 2010  USDA's MyPlate  ASA Prophylaxis  Lung CA Screening    Franklin Alfonso MD  [facility]  Chart documentation done in part with Dragon Voice recognition Software. Although reviewed after completion, some word and grammatical error may remain.

## 2020-01-14 NOTE — PATIENT INSTRUCTIONS
Get the labs today    Schedule for urology consult  Schedule for mammogram      Preventive Health Recommendations  Female Ages 40 to 49    Yearly exam:     See your health care provider every year in order to  1. Review health changes.   2. Discuss preventive care.    3. Review your medicines if your doctor prescribed any.      Get a Pap test every three years (unless you have an abnormal result and your provider advises testing more often).      If you get Pap tests with HPV test, you only need to test every 5 years, unless you have an abnormal result. You do not need a Pap test if your uterus was removed (hysterectomy) and you have not had cancer.      You should be tested each year for STDs (sexually transmitted diseases), if you're at risk.     Ask your doctor if you should have a mammogram.      Have a colonoscopy (test for colon cancer) if someone in your family has had colon cancer or polyps before age 50.       Have a cholesterol test every 5 years.       Have a diabetes test (fasting glucose) after age 45. If you are at risk for diabetes, you should have this test every 3 years.    Shots: Get a flu shot each year. Get a tetanus shot every 10 years.     Nutrition:     Eat at least 5 servings of fruits and vegetables each day.    Eat whole-grain bread, whole-wheat pasta and brown rice instead of white grains and rice.    Get adequate Calcium and Vitamin D.      Lifestyle    Exercise at least 150 minutes a week (an average of 30 minutes a day, 5 days a week). This will help you control your weight and prevent disease.    Limit alcohol to one drink per day.    No smoking.     Wear sunscreen to prevent skin cancer.    See your dentist every six months for an exam and cleaning.

## 2020-01-15 LAB
BACTERIA SPEC CULT: ABNORMAL
HIV 1+2 AB+HIV1 P24 AG SERPL QL IA: NONREACTIVE
SPECIMEN SOURCE: ABNORMAL

## 2020-01-16 ENCOUNTER — TELEPHONE (OUTPATIENT)
Dept: PEDIATRICS | Facility: CLINIC | Age: 45
End: 2020-01-16

## 2020-01-16 DIAGNOSIS — N39.0 RECURRENT UTI: Primary | ICD-10-CM

## 2020-01-16 RX ORDER — SULFAMETHOXAZOLE/TRIMETHOPRIM 800-160 MG
1 TABLET ORAL 2 TIMES DAILY
Qty: 14 TABLET | Refills: 0 | Status: SHIPPED | OUTPATIENT
Start: 2020-01-16 | End: 2020-01-23

## 2020-01-16 NOTE — PROGRESS NOTES
"CC: Recurrent urinary tract infections.    HPI: It is a pleasure to see Ms. Shaheen Flaherty, a very pleasant 44 year old female with PMH of Crohn's disease seen today in the urology clinic in consultation from Dr. Alfonso for evaluation of recurrent urinary tract infections. These have been ongoing since September 2019 when she started Humira (no h/o UTI's prior). She has since stopped Humira as of early Nov 2019, but the infections have persisted. She reports having 3-4 UTI's in the past 3 months. Infections are typically symptomatic.  Typical symptoms include: dysuria, frequency, urgency, suprapubic cramping, and chills. She denies gross hematuria, flank pain, or fevers. She has no history of stones.  She has never been hospitalized for UTIs.   With infection, Ms. Shaheen Flaherty typically goes to [place] clinics or urgent cares where cultures are performed (see below for available diagnostics). Is usually given antibiotics and reports that symptoms do resolve appropriately with therapy temporarily, but then recur after stopping antibiotics. She is currently being treated with Bactrim for a UTI. She is nervous about the repeated courses of antibiotics given her pre-existing GI issues.    She has identified the following possible triggers for her infections: constipation, intercourse.   Wipes front to back.   Has issues with constipation and diarrhea from her Crohn's.   Denies urinary incontinence.   Drinks a lot of water.   Has tried cranberry, D-mannose, probiotics.  Complains of pain with intercourse; like her partner is \"hitting a wall\" with penetration.  Today, she complains of vaginal itching and swollen labia that started at 3AM last night. Has not been sexually active in over a week.    Review of Diagnostics:  1/14/2020 - UA: large LE, >100 WBC, moderate bacteria --> UC: >100K E. Coli  10/12/19 - UA: negative LE, 5-10 WBC, 2-5 RBC --> UC: <10K Group B Strep    Past Medical History:   Diagnosis Date     " "Cervical disc herniation      Condyloma      Genital herpes      Past Surgical History:   Procedure Laterality Date     ENT SURGERY      oral surgery as a child     FUSION CERVICAL ANTERIOR TWO LEVELS  1/14/2013    Procedure: FUSION CERVICAL ANTERIOR TWO LEVELS;  C5-6 AND C6-7 ANTERIOR DISCECTOMY AND FUSION WITH MIRTA ANTERIOR INSTRUMENTATION (C-ARM, MIDAS, CONTRAVES) ;  Surgeon: Vince Frederick MD;  Location:  OR     Current Outpatient Medications   Medication Sig Dispense Refill     FOLIC ACID PO Take 1 tablet by mouth daily       IRON PO        Omega-3 Fatty Acids (OMEGA 3 PO)        Probiotic Product (PROBIOTIC DAILY PO) Take 1 tablet by mouth daily       sulfamethoxazole-trimethoprim (BACTRIM DS/SEPTRA DS) 800-160 MG tablet Take 1 tablet by mouth 2 times daily for 7 days 14 tablet 0     valACYclovir (VALTREX) 500 MG tablet Take one tab by mouth twice daily for 3 days. Start at first onset of symptoms 6 tablet 6     VITAMIN D, CHOLECALCIFEROL, PO Take by mouth daily       Allergies   Allergen Reactions     Penicillin V Hives       Family History: There is no h/o  carcinoma.  There is no h/o urolithiasis.    Social History: The patient does not smoke cigarettes, minimal EtOH.    ROS: A comprehensive 14 point ROS was obtained and was positive for Crohn's and otherwise negative except for that outlined above in the HPI.    PHYSICAL EXAM:   Blood pressure 90/68, pulse 79, height 1.632 m (5' 4.25\"), weight 51.7 kg (114 lb), SpO2 100 %, not currently breastfeeding.   Body mass index is 19.42 kg/m .  GENERAL: Well groomed/well developed/well nourished female in NAD.  HEENT: EOMI, AT, NC.  SKIN: Warm to touch, dry.  No visible rashes or lesions.  RESP: No increased respiratory effort.  LYMPH: No LE edema.  MS: Full ROM in extremities.  PELVIC: Normal external female genitalia. Mild fullness in bilateral labia majora, but no erythema, fluctuance, or tenderness to palpation. Vaginal mucosa is pink and " well-ruggaeted. Scant whitish discharge in the vaginal vault. Negative Whiff test. Urethra is patent without bleeding or discharge. Diffuse myofascial tenderness and increased tension.  NEURO: Alert and oriented x 3.  PSYCH: Normal mood and affect, pleasant and agreeable during interview and exam.    PVR: Postvoid residual urine volume as measured by ultrasound was 147 mL.    IMAGING: none    ASSESSMENT/PLAN:  Ms. Shaheen Flaherty is a 44 year old female with a history of recurrent urinary tract infections that started after she began taking Humira for Crohn's in Sept 2019. She has since stopped the Humira but infections have persisted. Currently being treated with Bactrim for an E. Coli UTI. Also complains of vaginal itching, swollen labia, and pain with intercourse. On exam, she was noted to have diffuse myofascial tension and tenderness as well as mild fullness of bilateral labia majora. We discussed all of the above including possible etiologies and management options. She would like to avoid an antibiotic-based prophylactic regimen given her GI issues. We therefore discussed a trial of methenamine and she would like to try this.    -Finish Bactrim for UTI (appropriate based on culture results).  -Diflucan 150 mg PO x 1 for suspected vaginal candidiasis. Second dose PRN at the completion of antibiotic course if vaginal symptoms persist.  -After completing Bactrim, then start methenamine 1 gram + vitamin C 1 gram for UTI prevention. Side effects discussed.   -Per patient request, Rx for Macrobid 100 mg BID x 5 days to have on hand for her upcoming out of country trip in case she develops another UTI while traveling.  -Encouraged to continue drinking plenty of water, void on a regular basis, double void to promote complete bladder emptying, void after intercourse, good perineal hygiene, etc.   -She may continue with cranberry supplement, D-mannose, and probiotic.   -For the labial fullness, discussed with patient  that I am unsure the cause of this (suspect minor trauma, though patient denies any recent intercourse, bike riding, etc.). Recommend observation and cold compresses as needed. If this continues, follow up with gynecology. Possible that vaginal candidiasis may be partially contributing, in which case Diflucan should help.  -For the pain with intercourse, suspect this is multifactorial from vaginitis and pelvic floor myofascial dysfunction. Discussed observation, treatment of vaginitis, or referral to pelvic floor PT. Will try treating for vaginal candidiasis. If not improved within 1-2 weeks, consider referral to PFPT.    As patient is very anxious about the underlying cause for these UTI's, will schedule for cystoscopy with Dr. Yuan next available to ensure no intravesical nidus for infections. If this is normal and infections persist, consider upper tract imaging with ultrasound or CT.     I have enjoyed participating in the medical care of this very pleasant patient.  Please don't hesitate to contact me with any questions or concerns.     Chelsey Gallardo PA-C  Department of Urology

## 2020-01-16 NOTE — TELEPHONE ENCOUNTER
Patient called, she wanted to go over the results from her UC.  Gave her message from Saranya Henriquez NP.   She agrees to plan, she is also scheduled with a urologist tomorrow.    Lesia Chilel RN, [facility]      Dr.Kuppa Kg  Is out of the office and we are reviewing your results for you.  Please follow up if further concerns or questions      Attached are your test results.  Urine culture is positive   Will send out a script for you    Please contact us if you have any questions.     Alyse Henriquez, CNP

## 2020-01-16 NOTE — RESULT ENCOUNTER NOTE
Dr.Kuppa Kg  Is out of the office and we are reviewing your results for you.  Please follow up if further concerns or questions     Attached are your test results.  Urine culture is positive   Will send out a script for you    Please contact us if you have any questions.    Alyse Henriquez, CNP

## 2020-01-17 ENCOUNTER — OFFICE VISIT (OUTPATIENT)
Dept: UROLOGY | Facility: CLINIC | Age: 45
End: 2020-01-17
Attending: FAMILY MEDICINE
Payer: COMMERCIAL

## 2020-01-17 VITALS
OXYGEN SATURATION: 100 % | DIASTOLIC BLOOD PRESSURE: 68 MMHG | BODY MASS INDEX: 19.46 KG/M2 | HEIGHT: 64 IN | HEART RATE: 79 BPM | SYSTOLIC BLOOD PRESSURE: 90 MMHG | WEIGHT: 114 LBS

## 2020-01-17 DIAGNOSIS — B37.31 CANDIDIASIS OF VAGINA: ICD-10-CM

## 2020-01-17 DIAGNOSIS — N39.0 RECURRENT UTI: Primary | ICD-10-CM

## 2020-01-17 DIAGNOSIS — N94.89 LABIAL SWELLING: ICD-10-CM

## 2020-01-17 PROCEDURE — 99204 OFFICE O/P NEW MOD 45 MIN: CPT | Performed by: PHYSICIAN ASSISTANT

## 2020-01-17 RX ORDER — FLUCONAZOLE 150 MG/1
150 TABLET ORAL ONCE
Qty: 2 TABLET | Refills: 0 | Status: SHIPPED | OUTPATIENT
Start: 2020-01-17 | End: 2020-01-17

## 2020-01-17 RX ORDER — METHENAMINE HIPPURATE 1000 MG/1
1 TABLET ORAL DAILY
Qty: 90 TABLET | Refills: 1 | Status: SHIPPED | OUTPATIENT
Start: 2020-01-17 | End: 2021-04-22

## 2020-01-17 RX ORDER — NITROFURANTOIN 25; 75 MG/1; MG/1
100 CAPSULE ORAL 2 TIMES DAILY
Qty: 10 CAPSULE | Refills: 0 | Status: SHIPPED | OUTPATIENT
Start: 2020-01-17 | End: 2021-04-22

## 2020-01-17 RX ORDER — MULTIVIT WITH MINERALS/LUTEIN
1000 TABLET ORAL DAILY
Qty: 90 TABLET | Refills: 1 | Status: SHIPPED | OUTPATIENT
Start: 2020-01-17 | End: 2021-04-22

## 2020-01-17 ASSESSMENT — MIFFLIN-ST. JEOR: SCORE: 1161.07

## 2020-01-17 NOTE — PATIENT INSTRUCTIONS
UROLOGY CLINIC VISIT PATIENT INSTRUCTIONS    1) Finish your course of antibiotics (trimethoprim-sulfamethoxazole) for your current E. Coli UTI.    2) For the possible yeast infection, take one dose of Diflucan (fluconazole) 150 mg today. If your vaginal symptoms persist, take a second dose after you finish antibiotics.     3) After you complete the antibiotic, the next day start taking methenamine (Hiprex) 1 gram + 1 gram of vitamin C together once daily. This is for UTI prevention.     4) A prescription for an antibiotic called nitrofurantion (Macrobid) was sent to your pharmacy. This is to be used as needed while you're traveling in case you get another UTI.    5) Continue to drink plenty of water, urinate on a regular basis, and make sure to spend a little extra time emptying your bladder.    6) If the labial/vaginal swelling does not improve, we will consider having you see gynecology.    7) Follow up with Dr. Yuan for a cystoscopy (scope to look into the bladder).    CYSTOSCOPY    What is a Cystoscopy?  This is a procedure done to check for problems inside the bladder.  Problems may include polyps (growths), tumors, inflammation (swelling and redness) and other concerns.    The doctor inserts a thin tube (called a cystoscope) into the bladder.  The tube is about the size of a pencil.  We will give you numbing medicine to reduce the pain or discomfort you may feel.    The tube allows the doctor to:  The doctor will be able to see inside the bladder by filling the bladder with water.  The water makes it easier to see any problems that may be present.    If needed, the doctor may use the tube to:  The doctor is able to take tissue samples (biopsies).  Samples are sent to the lab for testing.  The doctor can also burn off any small growths or tumors that are found.  This is call fulguration.    How should I get ready for the exam?  To prepare, stop taking any medications as instructed. Ask whether you should avoid  eating or drinking anything after midnight before the procedure. Follow any other instructions your doctor gives you.    If you are having this procedure done at the clinic, you will be there for up to an hour.  You will receive care before and after the procedure.    Please tell your doctor if:  - You have a history of urinary tract infections.  - You know that you have a tumor in your bladder.  - You have bleeding problems.  - You have any allergies.  - You are or may be pregnant.      What happens after the exam?  You may go back to your normal diet and activity as you feel ready, unless your doctor tells you not to.    For the next two days, you may notice:  - Some blood in your urine.  - Some burning when you urinate (use the toilet).  - An urge to urinate more often.  - Bladder spasms.    These are normal after the procedure. They should go away on their own after a day or two.      - You can help to relieve the above listed symptoms by:  - Drinking 6 to 8 large glasses of water each day (includes drinks at meals).  This will help clear the urine.  - Take warm baths to relieve pain and bladder spasms.  Do not add anything to the bath water.  - Your doctor may prescribe pain medicine.  You may also take Tylenol (acetaminophen) for pain.    When should I call my doctor?  - A fever over 100.0 F (38 C) for more than a day.  (Before you call the doctor, check your temperature under your tongue.)  - Chills.  - Failure to urinate: No urine comes out when you try to use the toilet.  (Try soaking in a bathtub full of warm water.  If still no urine, call your doctor.)  - A lot of blood in the urine or blood clots larger than a nickel.  - Pain in the back or abdomen (belly / stomach area).  - Pain or spasms that are not relieved by warm tub baths and pain medicine.  - Severe pain, burning or other problems while passing urine.  - Pain that gets worse after two days.      If you have any issues, questions or concerns in  the meantime, do not hesitate to contact us at [phone] or via [employer]UNITY.     It was a pleasure meeting with you today.  Thank you for allowing me and my team the privilege of caring for you today.  YOU are the reason we are here, and I truly hope we provided you with the excellent service you deserve.  Please let us know if there is anything else we can do for you so that we can be sure you are leaving completely satisfied with your care experience.

## 2020-01-17 NOTE — NURSING NOTE
"Shaheen Flaherty's goals for this visit include:   Chief Complaint   Patient presents with     New Patient     Recurrent UTI       She requests these members of her care team be copied on today's visit information: Franklin Alfonso    PCP: Elaina Douglas    Referring Provider:  Franklin Alfonso MD  [address] [place] [address]    BP 90/68 (BP Location: Left arm, Patient Position: Sitting, Cuff Size: Adult Regular)   Pulse 79   Ht 1.632 m (5' 4.25\")   Wt 51.7 kg (114 lb)   SpO2 100%   BMI 19.42 kg/m      Do you need any medication refills at today's visit? No    post void residual = 146 ml    Chelsey De Leon LPN      "

## 2020-01-20 ENCOUNTER — TELEPHONE (OUTPATIENT)
Dept: UROLOGY | Facility: CLINIC | Age: 45
End: 2020-01-20

## 2020-01-20 NOTE — TELEPHONE ENCOUNTER
----- Message from Chelsey De Leon LPN sent at 1/17/2020  2:50 PM CST -----  Regarding: Schedule  Please schedule Cystoscopy with Dr. Yuan, per Chelsey Gallardo PA-C.  Best number is [phone]

## 2020-01-20 NOTE — TELEPHONE ENCOUNTER
"Pt returned voicemail. She did not have further questions at the time of phone call - she has been scheduled for first available with Dr. Yuan on 3/2/2020 at 1:00.    *writer used the \"1 RTN AND 1 CYSTO AT 1:00PM\" time slot because there was only a return patient at 1:00 at time of scheduling.  "

## 2020-01-22 LAB
BACTERIA SPEC CULT: NORMAL
Lab: NORMAL
SPECIMEN SOURCE: NORMAL

## 2020-01-22 NOTE — RESULT ENCOUNTER NOTE
Dear Shaheen,   Your recent test result are within acceptable range or at baseline. Please continue with your current plan of care.       Please call or Mychart to our office if you have further questions.     Balaji Benitez MD-PhD

## 2020-02-05 ENCOUNTER — TRANSFERRED RECORDS (OUTPATIENT)
Dept: HEALTH INFORMATION MANAGEMENT | Facility: CLINIC | Age: 45
End: 2020-02-05

## 2020-08-07 DIAGNOSIS — B00.9 HSV INFECTION: ICD-10-CM

## 2020-08-07 NOTE — TELEPHONE ENCOUNTER
Reason for Call:  Other prescription    Detailed comments: refill req for Valtrex    Phone Number Patient can be reached at: Home number on file [phone] (home)    Best Time: today    Can we leave a detailed message on this number? YES    Call taken on 8/7/2020 at 12:55 PM by Leslie Aguilar

## 2020-08-10 RX ORDER — VALACYCLOVIR HYDROCHLORIDE 500 MG/1
TABLET, FILM COATED ORAL
Qty: 6 TABLET | Refills: 0 | Status: CANCELLED | OUTPATIENT
Start: 2020-08-10

## 2020-08-10 NOTE — TELEPHONE ENCOUNTER
"Last Written Prescription Date:  1/14/2020  Last Fill Quantity: 6,  # refills: 6   Last office visit: 6/20/2019 with prescribing provider:  Yes, A.S.   Future Office Visit:      [employer] message sent to patient via [employer].  Needs OV.   Last seen over one year ago.    Lucy Sandoval RN    Requested Prescriptions   Pending Prescriptions Disp Refills     valACYclovir (VALTREX) 500 MG tablet 6 tablet 6     Sig: Take one tab by mouth twice daily for 3 days. Start at first onset of symptoms       Antivirals for Herpes Protocol Failed - 8/7/2020  1:00 PM        Failed - Recent (12 mo) or future (30 days) visit within the authorizing provider's specialty     Patient has had an office visit with the authorizing provider or a provider within the authorizing providers department within the previous 12 mos or has a future within next 30 days. See \"Patient Info\" tab in inbasket, or \"Choose Columns\" in Meds & Orders section of the refill encounter.              Passed - Patient is age 12 or older        Passed - Medication is active on med list        Passed - Normal serum creatinine on file in past 12 months     Recent Labs   Lab Test 01/14/20  1153   CR 0.62       Ok to refill medication if creatinine is low                   "

## 2020-11-08 ENCOUNTER — HEALTH MAINTENANCE LETTER (OUTPATIENT)
Age: 45
End: 2020-11-08

## 2021-01-15 DIAGNOSIS — B00.9 HSV INFECTION: ICD-10-CM

## 2021-01-18 RX ORDER — VALACYCLOVIR HYDROCHLORIDE 500 MG/1
TABLET, FILM COATED ORAL
Qty: 6 TABLET | Refills: 0 | Status: SHIPPED | OUTPATIENT
Start: 2021-01-18 | End: 2021-03-22

## 2021-01-18 NOTE — TELEPHONE ENCOUNTER
Medication is being filled for 1 time refill only due to:  Patient due for follow-up for further refills**    Rimma Booker RN, BSN, CMSRN  [facility]

## 2021-03-28 ENCOUNTER — HEALTH MAINTENANCE LETTER (OUTPATIENT)
Age: 46
End: 2021-03-28

## 2021-04-19 ENCOUNTER — MYC MEDICAL ADVICE (OUTPATIENT)
Dept: FAMILY MEDICINE | Facility: CLINIC | Age: 46
End: 2021-04-19

## 2021-04-19 NOTE — TELEPHONE ENCOUNTER
Patient scheduled labs for 4/21. She stated she wants a full bloodwork panel done because she has never done this before. Informed patient there are no orders in the chart, but she should prepare for labs by fasting 10-12 hours prior. Please call patient if any other info is needed and place orders prior to appointment on 4/21.     Thank you  aKtya Banuelos  Central Scheduling

## 2021-04-19 NOTE — TELEPHONE ENCOUNTER
Patient scheduled labs for 4/21. She stated she wants a full bloodwork panel done because she has never done this before. Informed patient there are no orders in the chart, but she should prepare for labs by fasting 10-12 hours prior. Please call patient if any other info is needed and place orders prior to appointment on 4/21.    Thank you  Katya Banuelos  Central Scheduling

## 2021-04-19 NOTE — TELEPHONE ENCOUNTER
Needs appointment with Dr. Douglas.  Last 6/20/2019.    Scheduled patient to see A.S. on 4/21 at 11:00.  Cancelled lab appointment for 4/21 at 10:15.    Waiting for patient to confirm.  Lucy Sandoval RN

## 2021-04-21 NOTE — PROGRESS NOTES
She does not want complete physical  Had pap smear doen at OB in March 2021  and was told to get blood test  She feels her crohn is off- because lips are blue sometimes ???  Has good appetite, good excercise routine  Periods normal  Walks  a lot-attends spin/ strenght classes   No diary, no gluten , no sugar   Knee pain 0- repsoinded infra red siunna    Patient has been advised of split billing requirements and indicates understanding: Yes  Healthy Habits:     Getting at least 3 servings of Calcium per day:  Yes    Bi-annual eye exam:  Yes    Dental care twice a year:  Yes    Sleep apnea or symptoms of sleep apnea:  None    Diet:  Gluten-free/reduced    Frequency of exercise:  2-3 days/week    Duration of exercise:  15-30 minutes    Taking medications regularly:  Not Applicable    Medication side effects:  Not applicable    PHQ-2 Total Score: 0    Additional concerns today:  Yes          Today's PHQ-2 Score:   PHQ-2 ( 1999 [employer]) 4/22/2021   Q1: Little interest or pleasure in doing things 0   Q2: Feeling down, depressed or hopeless 0   PHQ-2 Score 0   Q1: Little interest or pleasure in doing things Not at all   Q2: Feeling down, depressed or hopeless Not at all   PHQ-2 Score 0       Abuse: Current or Past (Physical, Sexual or Emotional) - No  Do you feel safe in your environment? Yes    Have you ever done Advance Care Planning? (For example, a Health Directive, POLST, or a discussion with a medical provider or your loved ones about your wishes): No, advance care planning information given to patient to review.  Patient plans to discuss their wishes with loved ones or provider.      Social History     Tobacco Use     Smoking status: Never Smoker     Smokeless tobacco: Never Used   Substance Use Topics     Alcohol use: Yes     Alcohol/week: 0.0 standard drinks     Comment: 2 wine daily     If you drink alcohol do you typically have >3 drinks per day or >7 drinks per week? No    Alcohol Use 4/22/2021    Prescreen: >3 drinks/day or >7 drinks/week? No   AUDIT SCORE  -     AUDIT - Alcohol Use Disorders Identification Test - Reproduced from the World Health Organization Audit 2001 (Second Edition) 1/14/2020   1.  How often do you have a drink containing alcohol? 4 or more times a week   2.  How many drinks containing alcohol do you have on a typical day when you are drinking? 10 or more   3.  How often do you have five or more drinks on one occasion? Never   4.  How often during the last year have you found that you were not able to stop drinking once you had started? Never   5.  How often during the last year have you failed to do what was normally expected of you because of drinking? Never   6.  How often during the last year have you needed a first drink in the morning to get yourself going after a heavy drinking session? Never   7.  How often during the last year have you had a feeling of guilt or remorse after drinking? Never   8.  How often during the last year have you been unable to remember what happened the night before because of your drinking? Never   9.  Have you or someone else been injured because of your drinking? No   10. Has a relative, friend, doctor or other health care worker been concerned about your drinking or suggested you cut down? No   TOTAL SCORE 8   FSH-7:   Breast CA Risk Assessment (FHS-7) 4/22/2021   Did any of your first-degree relatives have breast or ovarian cancer? Yes   Did any of your relatives have bilateral breast cancer? No   genetic testting done by mom  Aunty had breast cancer at age 50  She wihtaker snot want genetic screneing    click delete button to remove this line now    Pertinent mammograms are reviewed under the imaging tab.    History of abnormal Pap smear: NO - age 30- 65 PAP every 3 years recommended  PAP / HPV Latest Ref Rng & Units 3/6/2018 2/13/2015   PAP - NIL -   HPV 16 DNA NEG:Negative Negative -   HPV 18 DNA NEG:Negative Negative -   OTHER HR HPV NEG:Negative  "Negative -   HPV-ABSTRA - - Negative     Reviewed and updated as needed this visit by clinical staff  Tobacco  Allergies  Meds              Reviewed and updated as needed this visit by Provider                    Review of Systems  CONSTITUTIONAL: NEGATIVE for fever, chills, change in weight  INTEGUMENTARU/SKIN: NEGATIVE for worrisome rashes, moles or lesions  EYES: NEGATIVE for vision changes or irritation  ENT: NEGATIVE for ear, mouth and throat problems  RESP: NEGATIVE for significant cough or SOB  BREAST: NEGATIVE for masses, tenderness or discharge  CV: NEGATIVE for chest pain, palpitations or peripheral edema  GI: NEGATIVE for nausea, abdominal pain, heartburn, or change in bowel habits  : NEGATIVE for unusual urinary or vaginal symptoms. Periods are regular.  MUSCULOSKELETAL: NEGATIVE for significant arthralgias or myalgia  NEURO: NEGATIVE for weakness, dizziness or paresthesias  PSYCHIATRIC: NEGATIVE for changes in mood or affect     OBJECTIVE:   BP (!) 78/59   Pulse 78   Temp 98.6  F (37  C) (Tympanic)   Resp 16   Ht 1.632 m (5' 4.25\")   Wt 52.4 kg (115 lb 9.6 oz)   LMP 04/18/2021 (Approximate)   SpO2 100%   BMI 19.69 kg/m    Physical Exam  GENERAL: healthy, alert and no distress  NECK: no adenopathy, no asymmetry, masses, or scars and thyroid normal to palpation  RESP: lungs clear to auscultation - no rales, rhonchi or wheezes  CV: regular rate and rhythm, normal S1 S2, no S3 or S4, no murmur, click or rub, no peripheral edema and peripheral pulses strong  ABDOMEN: soft, nontender, no hepatosplenomegaly, no masses and bowel sounds normal  PSYCH: mentation appears normal, affect normal/bright    Diagnostic Test Results:  Labs reviewed in Epic  Results for orders placed or performed in visit on 04/22/21 (from the past 24 hour(s))   CBC with platelets   Result Value Ref Range    WBC 6.2 4.0 - 11.0 10e9/L    RBC Count 3.55 (L) 3.8 - 5.2 10e12/L    Hemoglobin 10.3 (L) 11.7 - 15.7 g/dL    Hematocrit " 32.5 (L) 35.0 - 47.0 %    MCV 92 78 - 100 fl    MCH 29.0 26.5 - 33.0 pg    MCHC 31.7 31.5 - 36.5 g/dL    RDW 15.3 (H) 10.0 - 15.0 %    Platelet Count 328 150 - 450 10e9/L   CRP, inflammation   Result Value Ref Range    CRP Inflammation 14.0 (H) 0.0 - 8.0 mg/L   ESR: Erythrocyte sedimentation rate   Result Value Ref Range    Sed Rate 20 0 - 20 mm/h       ASSESSMENT/PLAN:   Shaheen  Wants labs done just to make sure nutrition is ok   Diagnoses and all orders for this visit:    Crohn's disease with complication, unspecified gastrointestinal tract location (H)  She has managed crohn's on her own.   Dairy and gluten free.no sugar diet   Has declined Gastroenterology consultation         -     CBC with platelets  -     Ferritin  -     Comprehensive metabolic panel (BMP + Alb, Alk Phos, ALT, AST, Total. Bili, TP)  -     CRP, inflammation  -     ESR: Erythrocyte sedimentation rate  Labs obtained and will be informed  Advised follow up for any unresolved concerns about being off? She reports no diarrhea , no rectal bleeding. Over all in usual state of health    Iron deficiency anemia  Low ferritin.  Patient has declined oral iron- due to  Intolerance.  Ok to proceed with injectable iron infusion      Encounter for screening mammogram for malignant neoplasm of breast  -     *MA Screening Digital Bilateral; Future    Elaina Douglas MD  [facility]

## 2021-04-22 ENCOUNTER — OFFICE VISIT (OUTPATIENT)
Dept: FAMILY MEDICINE | Facility: CLINIC | Age: 46
End: 2021-04-22
Payer: COMMERCIAL

## 2021-04-22 VITALS
WEIGHT: 115.6 LBS | DIASTOLIC BLOOD PRESSURE: 59 MMHG | HEIGHT: 64 IN | TEMPERATURE: 98.6 F | OXYGEN SATURATION: 100 % | SYSTOLIC BLOOD PRESSURE: 78 MMHG | RESPIRATION RATE: 16 BRPM | BODY MASS INDEX: 19.74 KG/M2 | HEART RATE: 78 BPM

## 2021-04-22 DIAGNOSIS — K50.919 CROHN'S DISEASE WITH COMPLICATION, UNSPECIFIED GASTROINTESTINAL TRACT LOCATION (H): Primary | ICD-10-CM

## 2021-04-22 DIAGNOSIS — D50.8 OTHER IRON DEFICIENCY ANEMIA: ICD-10-CM

## 2021-04-22 DIAGNOSIS — Z12.31 ENCOUNTER FOR SCREENING MAMMOGRAM FOR MALIGNANT NEOPLASM OF BREAST: ICD-10-CM

## 2021-04-22 LAB
ALBUMIN SERPL-MCNC: 3.5 G/DL (ref 3.4–5)
ALP SERPL-CCNC: 58 U/L (ref 40–150)
ALT SERPL W P-5'-P-CCNC: 11 U/L (ref 0–50)
ANION GAP SERPL CALCULATED.3IONS-SCNC: 7 MMOL/L (ref 3–14)
AST SERPL W P-5'-P-CCNC: 11 U/L (ref 0–45)
BILIRUB SERPL-MCNC: 0.7 MG/DL (ref 0.2–1.3)
BUN SERPL-MCNC: 6 MG/DL (ref 7–30)
CALCIUM SERPL-MCNC: 8.5 MG/DL (ref 8.5–10.1)
CHLORIDE SERPL-SCNC: 107 MMOL/L (ref 94–109)
CO2 SERPL-SCNC: 25 MMOL/L (ref 20–32)
CREAT SERPL-MCNC: 0.6 MG/DL (ref 0.52–1.04)
CRP SERPL-MCNC: 14 MG/L (ref 0–8)
ERYTHROCYTE [DISTWIDTH] IN BLOOD BY AUTOMATED COUNT: 15.3 % (ref 10–15)
ERYTHROCYTE [SEDIMENTATION RATE] IN BLOOD BY WESTERGREN METHOD: 20 MM/H (ref 0–20)
FERRITIN SERPL-MCNC: 6 NG/ML (ref 8–252)
GFR SERPL CREATININE-BSD FRML MDRD: >90 ML/MIN/{1.73_M2}
GLUCOSE SERPL-MCNC: 81 MG/DL (ref 70–99)
HCT VFR BLD AUTO: 32.5 % (ref 35–47)
HGB BLD-MCNC: 10.3 G/DL (ref 11.7–15.7)
MCH RBC QN AUTO: 29 PG (ref 26.5–33)
MCHC RBC AUTO-ENTMCNC: 31.7 G/DL (ref 31.5–36.5)
MCV RBC AUTO: 92 FL (ref 78–100)
PLATELET # BLD AUTO: 328 10E9/L (ref 150–450)
POTASSIUM SERPL-SCNC: 4 MMOL/L (ref 3.4–5.3)
PROT SERPL-MCNC: 7.4 G/DL (ref 6.8–8.8)
RBC # BLD AUTO: 3.55 10E12/L (ref 3.8–5.2)
SODIUM SERPL-SCNC: 139 MMOL/L (ref 133–144)
WBC # BLD AUTO: 6.2 10E9/L (ref 4–11)

## 2021-04-22 PROCEDURE — 85652 RBC SED RATE AUTOMATED: CPT | Performed by: FAMILY MEDICINE

## 2021-04-22 PROCEDURE — 99213 OFFICE O/P EST LOW 20 MIN: CPT | Performed by: FAMILY MEDICINE

## 2021-04-22 PROCEDURE — 80053 COMPREHEN METABOLIC PANEL: CPT | Performed by: FAMILY MEDICINE

## 2021-04-22 PROCEDURE — 85027 COMPLETE CBC AUTOMATED: CPT | Performed by: FAMILY MEDICINE

## 2021-04-22 PROCEDURE — 36415 COLL VENOUS BLD VENIPUNCTURE: CPT | Performed by: FAMILY MEDICINE

## 2021-04-22 PROCEDURE — 86140 C-REACTIVE PROTEIN: CPT | Performed by: FAMILY MEDICINE

## 2021-04-22 PROCEDURE — 82728 ASSAY OF FERRITIN: CPT | Performed by: FAMILY MEDICINE

## 2021-04-22 ASSESSMENT — MIFFLIN-ST. JEOR: SCORE: 1158.33

## 2021-04-22 NOTE — RESULT ENCOUNTER NOTE
CRP is high at 14, please see Gastroenterology or follow up in clinic if concerns with Gastroenterology issues    Low ferritin- indicating low iron stores due to anemia  Are you taking iron tabs.    CRP/ and hemoglobin / ferritin  should be followed closely- beny if you are having concerns with inflammation . I think at least a telephone only visit can help address, if you have further questions about these abnormal labs     Please keep us posted with questions or concerns .      Best Regards,    Elaina Douglas MD  [facility]  [phone]

## 2021-04-23 ENCOUNTER — TELEPHONE (OUTPATIENT)
Dept: FAMILY MEDICINE | Facility: CLINIC | Age: 46
End: 2021-04-23

## 2021-04-23 DIAGNOSIS — E61.1 IRON DEFICIENCY: ICD-10-CM

## 2021-04-23 RX ORDER — HEPARIN SODIUM,PORCINE 10 UNIT/ML
5 VIAL (ML) INTRAVENOUS
Status: CANCELLED | OUTPATIENT
Start: 2021-04-23

## 2021-04-23 RX ORDER — HEPARIN SODIUM (PORCINE) LOCK FLUSH IV SOLN 100 UNIT/ML 100 UNIT/ML
5 SOLUTION INTRAVENOUS
Status: CANCELLED | OUTPATIENT
Start: 2021-04-23

## 2021-04-23 NOTE — TELEPHONE ENCOUNTER
A.S,   Patient calling about results   Started taking Iron BID with something acidic (like OJ) about a week ago   Would like to get iron infusion because she said she feels like she needs a boost   Pended Injectafer order (>50kg) if you approve  Pt's weight 115lbs = 52 kg  Please advise  Thanks,  Latia BECERRIL RN

## 2021-04-28 ENCOUNTER — TELEPHONE (OUTPATIENT)
Dept: FAMILY MEDICINE | Facility: CLINIC | Age: 46
End: 2021-04-28

## 2021-04-28 PROBLEM — D50.8 OTHER IRON DEFICIENCY ANEMIA: Status: ACTIVE | Noted: 2021-04-28

## 2021-04-28 NOTE — TELEPHONE ENCOUNTER
----- Message from Karina Ceballos sent at 4/28/2021  2:03 PM CDT -----  Regarding: Venofer infusion  Hi Dr. Douglas,    In reviewing coverage for the Venofer her insurance follows FDA guidelines. Iron deficiency is not an approved diagnosis for iron by the FDA. Iron products are only covered for anemia diagnosis. In the lab notes under her ferritin you mention anemia. If the patient has iron deficiency anemia can you please add an applicable diagnosis code to the therapy plan.     Thank you,  Baudilio Castano   -  Infusion Therapy   [email or web address]   Office:[phone]  Fax: [phone]

## 2021-04-28 NOTE — TELEPHONE ENCOUNTER
Iron deficiency anemia  Low ferritin.  Patient has declined oral iron- due to  Intolerance.  Ok to proceed with injectable iron infusion  Notes addend from 4/22-   Hope that helps.

## 2021-04-30 ENCOUNTER — HOSPITAL ENCOUNTER (OUTPATIENT)
Dept: MAMMOGRAPHY | Facility: CLINIC | Age: 46
Discharge: HOME | End: 2021-04-30
Attending: FAMILY MEDICINE | Admitting: FAMILY MEDICINE
Payer: COMMERCIAL

## 2021-04-30 DIAGNOSIS — Z12.31 ENCOUNTER FOR SCREENING MAMMOGRAM FOR MALIGNANT NEOPLASM OF BREAST: ICD-10-CM

## 2021-04-30 PROCEDURE — 77067 SCR MAMMO BI INCL CAD: CPT

## 2021-05-03 ENCOUNTER — INFUSION THERAPY VISIT (OUTPATIENT)
Dept: INFUSION THERAPY | Facility: CLINIC | Age: 46
End: 2021-05-03
Attending: FAMILY MEDICINE
Payer: COMMERCIAL

## 2021-05-03 VITALS
TEMPERATURE: 98.6 F | DIASTOLIC BLOOD PRESSURE: 63 MMHG | SYSTOLIC BLOOD PRESSURE: 95 MMHG | HEART RATE: 65 BPM | RESPIRATION RATE: 16 BRPM

## 2021-05-03 DIAGNOSIS — E61.1 IRON DEFICIENCY: ICD-10-CM

## 2021-05-03 DIAGNOSIS — D50.8 OTHER IRON DEFICIENCY ANEMIA: Primary | ICD-10-CM

## 2021-05-03 PROCEDURE — 258N000003 HC RX IP 258 OP 636: Performed by: FAMILY MEDICINE

## 2021-05-03 PROCEDURE — 250N000011 HC RX IP 250 OP 636: Performed by: FAMILY MEDICINE

## 2021-05-03 PROCEDURE — 96365 THER/PROPH/DIAG IV INF INIT: CPT

## 2021-05-03 RX ORDER — HEPARIN SODIUM (PORCINE) LOCK FLUSH IV SOLN 100 UNIT/ML 100 UNIT/ML
5 SOLUTION INTRAVENOUS
Status: CANCELLED | OUTPATIENT
Start: 2021-05-10

## 2021-05-03 RX ORDER — HEPARIN SODIUM,PORCINE 10 UNIT/ML
5 VIAL (ML) INTRAVENOUS
Status: CANCELLED | OUTPATIENT
Start: 2021-05-10

## 2021-05-03 RX ADMIN — SODIUM CHLORIDE 250 ML: 9 INJECTION, SOLUTION INTRAVENOUS at 14:45

## 2021-05-03 RX ADMIN — FERRIC CARBOXYMALTOSE INJECTION 750 MG: 50 INJECTION, SOLUTION INTRAVENOUS at 14:45

## 2021-05-10 ENCOUNTER — INFUSION THERAPY VISIT (OUTPATIENT)
Dept: INFUSION THERAPY | Facility: CLINIC | Age: 46
End: 2021-05-10
Attending: FAMILY MEDICINE
Payer: COMMERCIAL

## 2021-05-10 VITALS
SYSTOLIC BLOOD PRESSURE: 84 MMHG | HEART RATE: 81 BPM | TEMPERATURE: 98 F | RESPIRATION RATE: 18 BRPM | DIASTOLIC BLOOD PRESSURE: 54 MMHG

## 2021-05-10 DIAGNOSIS — D50.8 OTHER IRON DEFICIENCY ANEMIA: Primary | ICD-10-CM

## 2021-05-10 DIAGNOSIS — E61.1 IRON DEFICIENCY: ICD-10-CM

## 2021-05-10 PROCEDURE — 250N000011 HC RX IP 250 OP 636: Performed by: FAMILY MEDICINE

## 2021-05-10 PROCEDURE — 258N000003 HC RX IP 258 OP 636: Performed by: FAMILY MEDICINE

## 2021-05-10 PROCEDURE — 96374 THER/PROPH/DIAG INJ IV PUSH: CPT

## 2021-05-10 RX ORDER — HEPARIN SODIUM,PORCINE 10 UNIT/ML
5 VIAL (ML) INTRAVENOUS
Status: CANCELLED | OUTPATIENT
Start: 2021-05-10

## 2021-05-10 RX ORDER — HEPARIN SODIUM (PORCINE) LOCK FLUSH IV SOLN 100 UNIT/ML 100 UNIT/ML
5 SOLUTION INTRAVENOUS
Status: CANCELLED | OUTPATIENT
Start: 2021-05-10

## 2021-05-10 RX ADMIN — SODIUM CHLORIDE 250 ML: 9 INJECTION, SOLUTION INTRAVENOUS at 14:55

## 2021-05-10 RX ADMIN — FERRIC CARBOXYMALTOSE INJECTION 750 MG: 50 INJECTION, SOLUTION INTRAVENOUS at 14:55

## 2021-05-10 NOTE — PROGRESS NOTES
Infusion Nursing Note:  Shaheen ROHAN Flaherty presents today for injectafer.    Patient seen by provider today: No   present during visit today: Not Applicable.    Note: N/A.  Patient did not meet criteria for an asymptomatic covid-19 PCR test in infusion today.  Intravenous Access:  Peripheral IV placed.    Treatment Conditions:  Not Applicable.      Post Infusion Assessment:  Patient tolerated infusion without incident.  Patient observed for 30 minutes post injectafer per protocol.  Blood return noted pre and post infusion.  Site patent and intact, free from redness, edema or discomfort.  No evidence of extravasations.  Access discontinued per protocol.       Discharge Plan:   Discharge instructions reviewed with: Patient.  Patient and/or family verbalized understanding of discharge instructions and all questions answered.  Patient discharged in stable condition accompanied by: self.  Departure Mode: Ambulatory.    Chelsey Christianson RN

## 2021-09-11 ENCOUNTER — HEALTH MAINTENANCE LETTER (OUTPATIENT)
Age: 46
End: 2021-09-11

## 2021-10-15 ENCOUNTER — TRANSFERRED RECORDS (OUTPATIENT)
Dept: HEALTH INFORMATION MANAGEMENT | Facility: CLINIC | Age: 46
End: 2021-10-15
Payer: COMMERCIAL

## 2021-10-15 LAB
ALT SERPL-CCNC: 8 IU/L (ref 0–32)
AST SERPL-CCNC: 14 IU/L (ref 0–40)
CREATININE (EXTERNAL): 0.48 MG/DL (ref 0.57–1)
GFR ESTIMATED (EXTERNAL): 119 ML/MIN/1.73M2
GFR ESTIMATED (IF AFRICAN AMERICAN) (EXTERNAL): 137 ML/MIN/1.73M2
GLUCOSE (EXTERNAL): 90 MG/DL (ref 65–99)
POTASSIUM (EXTERNAL): 4.1 MMOL/L (ref 3.5–5.2)
TSH SERPL-ACNC: 1.65 UIU/ML (ref 0.45–4.5)

## 2022-03-07 ENCOUNTER — TRANSFERRED RECORDS (OUTPATIENT)
Dept: HEALTH INFORMATION MANAGEMENT | Facility: CLINIC | Age: 47
End: 2022-03-07

## 2022-03-17 ENCOUNTER — TRANSFERRED RECORDS (OUTPATIENT)
Dept: HEALTH INFORMATION MANAGEMENT | Facility: CLINIC | Age: 47
End: 2022-03-17
Payer: COMMERCIAL

## 2022-04-23 ENCOUNTER — HEALTH MAINTENANCE LETTER (OUTPATIENT)
Age: 47
End: 2022-04-23

## 2022-06-01 ENCOUNTER — LAB (OUTPATIENT)
Dept: LAB | Facility: CLINIC | Age: 47
End: 2022-06-01
Payer: COMMERCIAL

## 2022-06-01 LAB
HOLD SPECIMEN: NORMAL
HOLD SPECIMEN: NORMAL

## 2022-06-02 ENCOUNTER — TELEPHONE (OUTPATIENT)
Dept: FAMILY MEDICINE | Facility: CLINIC | Age: 47
End: 2022-06-02
Payer: COMMERCIAL

## 2022-06-02 NOTE — TELEPHONE ENCOUNTER
Left message for patient to call [facility] back  When patient calls back please transfer to an RN  Latia BECERRIL RN

## 2022-06-02 NOTE — TELEPHONE ENCOUNTER
----- Message from Elaina Douglas MD sent at 6/1/2022  6:50 PM CDT -----  Regarding: RE: iron order  Dear Triage,  Please call patient  Last office visit > 1 yr ago.  History of anemia-   1. Has she seen  recent Gastroenterology ?  2.has she taken or tolerated over the counter iron tabs  3. She does need appointment, ideally office visit - to discuss anemia & treatment- I have never prescribed iron infusion - so unsure which infusion center she went.  If it is recommended from  provider outside [facility]- please have them send the request of blood test directly to lab- [email or web address]  she at least needs a VV - today's encounter or OV to sort out her needs and expectations     ----- Message -----  From: Vanessa Carpio  Sent: 6/1/2022   2:37 PM CDT  To: Elaina Douglas MD  Subject: iron order                                       Hey,    Patient walked in and said she tried to get an iron infusion and they said she needs her blood drawn first. Please order as appropriate ASAP

## 2022-06-02 NOTE — TELEPHONE ENCOUNTER
"Lab came to talk to RN   Pt hung up on my earlier saying \"I will just wait until my labs are back\" - said she had outside lab orders  Lab states pt was rude - came in and sat in the lab chair and demand labs be drawn under Dr Douglas  Called pt back - told her she needed appt to get labs running  Latia BECERRIL RN    "

## 2022-08-13 ENCOUNTER — HEALTH MAINTENANCE LETTER (OUTPATIENT)
Age: 47
End: 2022-08-13

## 2022-08-27 ENCOUNTER — TELEPHONE (OUTPATIENT)
Dept: FAMILY MEDICINE | Facility: CLINIC | Age: 47
End: 2022-08-27

## 2022-08-27 NOTE — TELEPHONE ENCOUNTER
Reason for Call:  Medication or medication refill:    Do you use a [facility]?  Name of the pharmacy and phone number for the current request:  [facility] in [place]    Name of the medication requested: predinose     Other request: can barley walk and in a lot of pain    Can we leave a detailed message on this number? YES    Phone number patient can be reached at: Home number on file [phone] (home)    Best Time: any    Call taken on 8/27/2022 at 8:38 AM by Renae Hagan

## 2022-08-29 NOTE — TELEPHONE ENCOUNTER
Last seen 4/21/2021   Needs visit   Left message for patient to call [facility] back to discuss symptoms  When patient calls back please transfer to an RN  Latia BECERRIL, RN     [id number] Detailed

## 2022-09-12 ENCOUNTER — OFFICE VISIT (OUTPATIENT)
Dept: FAMILY MEDICINE | Facility: CLINIC | Age: 47
End: 2022-09-12
Payer: COMMERCIAL

## 2022-09-12 ENCOUNTER — HOSPITAL ENCOUNTER (EMERGENCY)
Facility: CLINIC | Age: 47
Discharge: LEFT BEFORE DISCHARGE | End: 2022-09-12
Payer: COMMERCIAL

## 2022-09-12 ENCOUNTER — NURSE TRIAGE (OUTPATIENT)
Dept: NURSING | Facility: CLINIC | Age: 47
End: 2022-09-12

## 2022-09-12 ENCOUNTER — TRANSFERRED RECORDS (OUTPATIENT)
Dept: HEALTH INFORMATION MANAGEMENT | Facility: CLINIC | Age: 47
End: 2022-09-12

## 2022-09-12 ENCOUNTER — OFFICE VISIT (OUTPATIENT)
Dept: PEDIATRICS | Facility: CLINIC | Age: 47
End: 2022-09-12
Payer: COMMERCIAL

## 2022-09-12 ENCOUNTER — ANCILLARY PROCEDURE (OUTPATIENT)
Dept: CT IMAGING | Facility: CLINIC | Age: 47
End: 2022-09-12
Attending: STUDENT IN AN ORGANIZED HEALTH CARE EDUCATION/TRAINING PROGRAM
Payer: COMMERCIAL

## 2022-09-12 VITALS
HEART RATE: 75 BPM | DIASTOLIC BLOOD PRESSURE: 54 MMHG | TEMPERATURE: 98.2 F | SYSTOLIC BLOOD PRESSURE: 78 MMHG | OXYGEN SATURATION: 100 %

## 2022-09-12 VITALS
SYSTOLIC BLOOD PRESSURE: 84 MMHG | BODY MASS INDEX: 19.93 KG/M2 | WEIGHT: 117 LBS | DIASTOLIC BLOOD PRESSURE: 56 MMHG | HEART RATE: 70 BPM | TEMPERATURE: 98 F | OXYGEN SATURATION: 100 %

## 2022-09-12 DIAGNOSIS — B00.9 HSV INFECTION: ICD-10-CM

## 2022-09-12 DIAGNOSIS — E87.6 HYPOKALEMIA: ICD-10-CM

## 2022-09-12 DIAGNOSIS — R10.31 RLQ ABDOMINAL PAIN: ICD-10-CM

## 2022-09-12 DIAGNOSIS — E86.0 DEHYDRATION: ICD-10-CM

## 2022-09-12 DIAGNOSIS — K50.919 CROHN'S DISEASE WITH COMPLICATION, UNSPECIFIED GASTROINTESTINAL TRACT LOCATION (H): Primary | ICD-10-CM

## 2022-09-12 DIAGNOSIS — E03.9 ACQUIRED HYPOTHYROIDISM: ICD-10-CM

## 2022-09-12 DIAGNOSIS — R10.9 RIGHT SIDED ABDOMINAL PAIN: ICD-10-CM

## 2022-09-12 DIAGNOSIS — R10.9 RIGHT SIDED ABDOMINAL PAIN: Primary | ICD-10-CM

## 2022-09-12 DIAGNOSIS — K50.919 CROHN'S DISEASE WITH COMPLICATION, UNSPECIFIED GASTROINTESTINAL TRACT LOCATION (H): ICD-10-CM

## 2022-09-12 DIAGNOSIS — Z12.4 CERVICAL CANCER SCREENING: ICD-10-CM

## 2022-09-12 LAB
ALBUMIN SERPL-MCNC: 3.2 G/DL (ref 3.4–5)
ALP SERPL-CCNC: 53 U/L (ref 40–150)
ALT SERPL W P-5'-P-CCNC: 14 U/L (ref 0–50)
ANION GAP SERPL CALCULATED.3IONS-SCNC: 3 MMOL/L (ref 3–14)
AST SERPL W P-5'-P-CCNC: 10 U/L (ref 0–45)
BILIRUB SERPL-MCNC: 0.5 MG/DL (ref 0.2–1.3)
BUN SERPL-MCNC: 10 MG/DL (ref 7–30)
CALCIUM SERPL-MCNC: 8.4 MG/DL (ref 8.5–10.1)
CHLORIDE BLD-SCNC: 108 MMOL/L (ref 94–109)
CO2 SERPL-SCNC: 29 MMOL/L (ref 20–32)
CREAT SERPL-MCNC: 0.71 MG/DL (ref 0.52–1.04)
ERYTHROCYTE [DISTWIDTH] IN BLOOD BY AUTOMATED COUNT: 14.5 % (ref 10–15)
GFR SERPL CREATININE-BSD FRML MDRD: >90 ML/MIN/1.73M2
GLUCOSE BLD-MCNC: 92 MG/DL (ref 70–99)
HCT VFR BLD AUTO: 34.9 % (ref 35–47)
HGB BLD-MCNC: 11.1 G/DL (ref 11.7–15.7)
MCH RBC QN AUTO: 30.2 PG (ref 26.5–33)
MCHC RBC AUTO-ENTMCNC: 31.8 G/DL (ref 31.5–36.5)
MCV RBC AUTO: 95 FL (ref 78–100)
PLATELET # BLD AUTO: 340 10E3/UL (ref 150–450)
POTASSIUM BLD-SCNC: 3.1 MMOL/L (ref 3.4–5.3)
PROT SERPL-MCNC: 6.7 G/DL (ref 6.8–8.8)
RBC # BLD AUTO: 3.67 10E6/UL (ref 3.8–5.2)
SODIUM SERPL-SCNC: 140 MMOL/L (ref 133–144)
WBC # BLD AUTO: 11.7 10E3/UL (ref 4–11)

## 2022-09-12 PROCEDURE — 84443 ASSAY THYROID STIM HORMONE: CPT | Performed by: STUDENT IN AN ORGANIZED HEALTH CARE EDUCATION/TRAINING PROGRAM

## 2022-09-12 PROCEDURE — 96365 THER/PROPH/DIAG IV INF INIT: CPT | Performed by: STUDENT IN AN ORGANIZED HEALTH CARE EDUCATION/TRAINING PROGRAM

## 2022-09-12 PROCEDURE — 99207 REFERRAL TO ACUTE AND DIAGNOSTIC SERVICES: CPT | Performed by: PHYSICIAN ASSISTANT

## 2022-09-12 PROCEDURE — 99417 PROLNG OP E/M EACH 15 MIN: CPT | Performed by: STUDENT IN AN ORGANIZED HEALTH CARE EDUCATION/TRAINING PROGRAM

## 2022-09-12 PROCEDURE — 99215 OFFICE O/P EST HI 40 MIN: CPT | Mod: 25 | Performed by: STUDENT IN AN ORGANIZED HEALTH CARE EDUCATION/TRAINING PROGRAM

## 2022-09-12 PROCEDURE — 74177 CT ABD & PELVIS W/CONTRAST: CPT | Performed by: STUDENT IN AN ORGANIZED HEALTH CARE EDUCATION/TRAINING PROGRAM

## 2022-09-12 PROCEDURE — 80053 COMPREHEN METABOLIC PANEL: CPT | Performed by: STUDENT IN AN ORGANIZED HEALTH CARE EDUCATION/TRAINING PROGRAM

## 2022-09-12 PROCEDURE — 85027 COMPLETE CBC AUTOMATED: CPT | Performed by: STUDENT IN AN ORGANIZED HEALTH CARE EDUCATION/TRAINING PROGRAM

## 2022-09-12 PROCEDURE — 36415 COLL VENOUS BLD VENIPUNCTURE: CPT | Performed by: STUDENT IN AN ORGANIZED HEALTH CARE EDUCATION/TRAINING PROGRAM

## 2022-09-12 RX ORDER — LEVOTHYROXINE SODIUM 50 UG/1
50 TABLET ORAL DAILY
COMMUNITY
Start: 2022-07-28

## 2022-09-12 RX ORDER — DEXTROSE, SODIUM CHLORIDE, AND POTASSIUM CHLORIDE 5; .45; .15 G/100ML; G/100ML; G/100ML
1000 INJECTION INTRAVENOUS ONCE
Status: COMPLETED | OUTPATIENT
Start: 2022-09-12 | End: 2022-09-12

## 2022-09-12 RX ORDER — MESALAMINE 800 MG/1
800 TABLET, DELAYED RELEASE ORAL DAILY
COMMUNITY
Start: 2022-09-12 | End: 2022-09-12

## 2022-09-12 RX ORDER — PREDNISONE 20 MG/1
40 TABLET ORAL DAILY
COMMUNITY
Start: 2022-09-01

## 2022-09-12 RX ORDER — VALACYCLOVIR HYDROCHLORIDE 500 MG/1
TABLET, FILM COATED ORAL
Qty: 6 TABLET | Refills: 1 | Status: SHIPPED | OUTPATIENT
Start: 2022-09-12 | End: 2022-09-16

## 2022-09-12 RX ORDER — IOPAMIDOL 755 MG/ML
70 INJECTION, SOLUTION INTRAVASCULAR ONCE
Status: COMPLETED | OUTPATIENT
Start: 2022-09-12 | End: 2022-09-12

## 2022-09-12 RX ORDER — POTASSIUM CHLORIDE 1500 MG/1
20 TABLET, EXTENDED RELEASE ORAL 2 TIMES DAILY
Qty: 10 TABLET | Refills: 0 | Status: SHIPPED | OUTPATIENT
Start: 2022-09-12 | End: 2022-09-17

## 2022-09-12 RX ORDER — MESALAMINE 1.2 G/1
1.2 TABLET, DELAYED RELEASE ORAL 2 TIMES DAILY
COMMUNITY
Start: 2022-07-07

## 2022-09-12 RX ADMIN — DEXTROSE, SODIUM CHLORIDE, AND POTASSIUM CHLORIDE 1000 ML: 5; .45; .15 INJECTION INTRAVENOUS at 15:42

## 2022-09-12 RX ADMIN — IOPAMIDOL 70 ML: 755 INJECTION, SOLUTION INTRAVASCULAR at 15:25

## 2022-09-12 ASSESSMENT — PAIN SCALES - GENERAL: PAINLEVEL: MODERATE PAIN (5)

## 2022-09-12 NOTE — TELEPHONE ENCOUNTER
Patient calling in today stating  She is having pain in lower right abdomen side radiating into the back.   Pain comes and goes. Finishing her menstrual cycle recently. Thought it was her period cramping but pain has not gone away.   Stated the pain is severe. Asked if it was excruciating and she stated yes.   Per RN protocol patient should be seen in the ER.   Declined ER and wanted an appointment to be seen in clinic. Transferred to scheduling.  Florinda Guzman RN, BSN [facility] Triage Nurse      Reason for Disposition    SEVERE abdominal pain (e.g., excruciating)    Additional Information    Negative: Passed out (i.e., fainted, collapsed and was not responding)    Negative: Shock suspected (e.g., cold/pale/clammy skin, too weak to stand, low BP, rapid pulse)    Negative: Sounds like a life-threatening emergency to the triager    Negative: Chest pain    Negative: Pain is mainly in upper abdomen (if needed ask: 'is it mainly above the belly button?')    Negative: Abdominal pain and pregnant < 20 weeks    Negative: Abdominal pain and pregnant 20 or more weeks    Protocols used: ABDOMINAL PAIN - FEMALE-A-OH

## 2022-09-12 NOTE — PROGRESS NOTES
Referral to Acute and Diagnostic Services    The [facility] has been contacted at [phone] ([place]) to confirm patient acceptance. The transition to Acute & Diagnostic Services Clinic has been discussed with patient, and she agrees with next level of care.  Patient understands that evaluation/treatment at [facility] typically takes significantly longer than in clinic/urgent care (>2 hours).        Special issues:  None        Referral placed: Yes  Patient has transportation arranged and will travel to the [facility] without delay: Yes  Patient aware not to eat or drink. Yes    The following provider has assessed this patient for intervention at [facility], and directed the patient for referral: LIANG Alcantar PA-C        Assessment & Plan     Right sided abdominal pain  New and different pain from her crohn's disease pain.  [facility] and pt agreeable for them to see her today and evaluate.    - Referral to Acute and Diagnostic Services (Day of diagnostic / First order acute); Future    Crohn's disease with complication, unspecified gastrointestinal tract location (H)  As above  - Referral to Acute and Diagnostic Services (Day of diagnostic / First order acute); Future    HSV infection  Refilled.    - valACYclovir (VALTREX) 500 MG tablet; TAKE ONE TAB BY MOUTH TWICE DAILY FOR 3 DAYS. START AT FIRST ONSET OF SYMPTOMS                 No follow-ups on file.    Shelbi Meza PA-C  [facility] BERNICE Jamison is a 47 year old accompanied by her self, presenting for the following health issues:    Abdominal Pain    History of Present Illness       Reason for visit:  Adominal pain  Symptom onset:  1-3 days ago    She eats 4 or more servings of fruits and vegetables daily.She consumes 0 sweetened beverage(s) daily.She exercises with enough effort to increase her heart rate 30 to 60 minutes per day.  She exercises with  enough effort to increase her heart rate 5 days per week.   She is taking medications regularly.     Still on steroids for her crohn's.  Pain is on right side that moves to back.  Normally if she has a bm her pain gets better when it is from her crohn's.  Having a bm but doesn't change pain.  Pain comes in waves and worse at night.  No appetite but not vomiting.  No fevers.   Food doesn't change it.  Just had normal period.  No pain with urination or rashes.  No other vaginal changes.           Review of Systems   As above      Objective    BP (!) 84/56 (BP Location: Right arm, Patient Position: Chair, Cuff Size: Adult Regular)   Pulse 70   Temp 98  F (36.7  C) (Oral)   Wt 53.1 kg (117 lb)   SpO2 100%   BMI 19.93 kg/m    Body mass index is 19.93 kg/m .  Physical Exam  Constitutional:       Comments: Appears uncomfortable and in tears   Cardiovascular:      Rate and Rhythm: Normal rate and regular rhythm.   Pulmonary:      Effort: Pulmonary effort is normal.      Breath sounds: Normal breath sounds.   Abdominal:      General: Bowel sounds are normal. There is no distension.      Tenderness: There is abdominal tenderness (most of her pain is RLQ on palpation but does have pain ruq and epigastric). There is no right CVA tenderness or rebound.   Neurological:      Mental Status: She is alert.

## 2022-09-12 NOTE — PROGRESS NOTES
Assessment & Plan     Crohn's disease with complication, unspecified gastrointestinal tract location (H)  Patient presents with significant right sided abdominal pain for the past 3 days. She is currently on prednisone 40 mg daily for for flare of joint pain from Crohns which she started 2 weeks ago. She has not been taking mesalamine for the past 2 weeks since starting prednisone. She was also started on supplements 1 month ago through a doctor who is [facility] medicine provider in [place]. She does not have the information of the supplement names with her today. CT enterography shows significant inflammation of the cecum obscuring view of the appendix and 1 cm area of possible developing abscess vs Crohn's flare. I paged GI Dr. Barahona on-call recommends she is seen in the ER.  Advised patient goes to the ER as she will need close monitoring and possible imaging with MRI? Or other modality to better evaluate if this is appendicitis, monitoring of WBC and potassium, IVFs, etc.   - Referral to Acute and Diagnostic Services (Day of diagnostic / First order acute)  [place]    Hypokalemia  Her K is 3.1 and she is dehydrated from poor appetite associated with the pain so I gave her D5 with potassium and she had 500 mL and approximately 10 meQ of potassium. I will send her home with prescription for potassium oral supplement for the next 5 days and recheck level at the end of the week with lab visit.   - dextrose 5% and 0.45% NaCl + KCl 20 mEq/L BOLUS  - potassium chloride ER (KLOR-CON M) 20 MEQ CR tablet; Take 1 tablet (20 mEq) by mouth 2 times daily for 5 days    Dehydration  - dextrose 5% and 0.45% NaCl + KCl 20 mEq/L BOLUS    Right sided abdominal pain  - Referral to Acute and Diagnostic Services (Day of diagnostic / First order acute)    RLQ abdominal pain  - CBC with platelets  - Comprehensive metabolic panel (BMP + Alb, Alk Phos, ALT, AST, Total. Bili, TP)  - CT Enterography with Contrast      120 minutes spent on the  date of the encounter doing chart review, review of test results, interpretation of tests, patient visit and documentation            No follow-ups on file.    ANN Hood CNP  [facility] is a 47 year [facility], presenting for the following health issues:  Abdominal Pain      HPI     Pain History:  When did you first notice your pain? - Acute Pain   Have you seen anyone else for your pain? Yes - Urgent care today  Where in your body do you have pain? Abdominal/Flank Pain  Onset/Duration: x 3 days   Description:   Character: Sharp  Location: right lower quadrant  Radiation: Back  Intensity: severe  Progression of Symptoms:  worsening  Accompanying Signs & Symptoms:  Fever/Chills: No  Gas/Bloating: No  Nausea: No  Vomitting: No  Diarrhea: No  Constipation: No  Dysuria or Hematuria: No  History:   Trauma: No  Previous similar pain: No  Previous tests done: none  Precipitating factors:   Does the pain change with:     Food: No    Bowel Movement: No    Urination: No   Other factors:  No  Therapies tried and outcome: Tylenol Friday was helpful   No LMP recorded.      Patient Active Problem List   Diagnosis     Spinal stenosis in cervical region     Crohn's disease with complication, unspecified gastrointestinal tract location (H)     Iron deficiency     Other iron deficiency anemia     Current Outpatient Medications   Medication     potassium chloride ER (KLOR-CON M) 20 MEQ CR tablet     predniSONE (DELTASONE) 20 MG tablet     IRON PO     levothyroxine (SYNTHROID/LEVOTHROID) 50 MCG tablet     mesalamine (LIALDA) 1.2 g DR tablet     Probiotic Product (PROBIOTIC DAILY PO)     valACYclovir (VALTREX) 500 MG tablet     VITAMIN D, CHOLECALCIFEROL, PO     No current facility-administered medications for this visit.           Review of Systems   Constitutional, HEENT, cardiovascular, pulmonary, GI, , musculoskeletal, neuro, skin, endocrine and psych systems are negative,  except as otherwise noted.      Objective    BP 97/64 (BP Location: Right arm, Patient Position: Sitting, Cuff Size: Adult Regular)   Pulse 72   Temp 98.2  F (36.8  C) (Oral)   SpO2 100%   There is no height or weight on file to calculate BMI.  Physical Exam   GENERAL: alert and no acute distress  EYES: Eyes grossly normal to inspection, PERRL and conjunctivae and sclerae normal  RESP: lungs clear to auscultation - no rales, rhonchi or wheezes  CV: regular rate and rhythm, normal S1 S2, no S3 or S4, no murmur, click or rub, no peripheral edema   ABDOMEN: significant tenderness to light palpation of RLQ and moderate tenderness to palpation of RUQ with deep palpation, no organomegaly or masses and bowel sounds normal  MS: no gross musculoskeletal defects noted, no edema  SKIN: no suspicious lesions or rashes  NEURO: Normal strength and tone, mentation intact and speech normal  PSYCH: mentation appears normal, affect normal/bright    Results for orders placed or performed in visit on 09/12/22 (from the past 24 hour(s))   CBC with platelets   Result Value Ref Range    WBC Count 11.7 (H) 4.0 - 11.0 10e3/uL    RBC Count 3.67 (L) 3.80 - 5.20 10e6/uL    Hemoglobin 11.1 (L) 11.7 - 15.7 g/dL    Hematocrit 34.9 (L) 35.0 - 47.0 %    MCV 95 78 - 100 fL    MCH 30.2 26.5 - 33.0 pg    MCHC 31.8 31.5 - 36.5 g/dL    RDW 14.5 10.0 - 15.0 %    Platelet Count 340 150 - 450 10e3/uL   Comprehensive metabolic panel (BMP + Alb, Alk Phos, ALT, AST, Total. Bili, TP)   Result Value Ref Range    Sodium 140 133 - 144 mmol/L    Potassium 3.1 (L) 3.4 - 5.3 mmol/L    Chloride 108 94 - 109 mmol/L    Carbon Dioxide (CO2) 29 20 - 32 mmol/L    Anion Gap 3 3 - 14 mmol/L    Urea Nitrogen 10 7 - 30 mg/dL    Creatinine 0.71 0.52 - 1.04 mg/dL    Calcium 8.4 (L) 8.5 - 10.1 mg/dL    Glucose 92 70 - 99 mg/dL    Alkaline Phosphatase 53 40 - 150 U/L    AST 10 0 - 45 U/L    ALT 14 0 - 50 U/L    Protein Total 6.7 (L) 6.8 - 8.8 g/dL    Albumin 3.2 (L) 3.4 - 5.0  g/dL    Bilirubin Total 0.5 0.2 - 1.3 mg/dL    GFR Estimate >90 >60 mL/min/1.73m2

## 2022-09-13 ASSESSMENT — ENCOUNTER SYMPTOMS
ARTHRALGIAS: 1
BREAST MASS: 0
DIARRHEA: 1
ABDOMINAL PAIN: 1

## 2022-09-14 NOTE — PROGRESS NOTES
Patient called regarding her visit on Monday 9/12/2022. Pt reports continuing to not feel well. She stated she went to the emergency room on Monday but left without being seen.  Patient was advised to go back  the the [facility] for evaluation/treatment. Patient verbalized she understood the recommendation.       Chelsey Comer RN  [facility]

## 2022-09-15 ENCOUNTER — TRANSFERRED RECORDS (OUTPATIENT)
Dept: HEALTH INFORMATION MANAGEMENT | Facility: CLINIC | Age: 47
End: 2022-09-15

## 2022-09-16 ENCOUNTER — OFFICE VISIT (OUTPATIENT)
Dept: FAMILY MEDICINE | Facility: CLINIC | Age: 47
End: 2022-09-16
Payer: COMMERCIAL

## 2022-09-16 VITALS
TEMPERATURE: 98.2 F | DIASTOLIC BLOOD PRESSURE: 64 MMHG | OXYGEN SATURATION: 96 % | SYSTOLIC BLOOD PRESSURE: 93 MMHG | BODY MASS INDEX: 19.12 KG/M2 | WEIGHT: 112 LBS | HEART RATE: 77 BPM | HEIGHT: 64 IN | RESPIRATION RATE: 20 BRPM

## 2022-09-16 DIAGNOSIS — B00.9 HSV INFECTION: ICD-10-CM

## 2022-09-16 DIAGNOSIS — E03.9 ACQUIRED HYPOTHYROIDISM: ICD-10-CM

## 2022-09-16 DIAGNOSIS — Z12.4 CERVICAL CANCER SCREENING: ICD-10-CM

## 2022-09-16 DIAGNOSIS — Z00.00 ENCOUNTER FOR PREVENTIVE CARE: Primary | ICD-10-CM

## 2022-09-16 DIAGNOSIS — Z12.31 VISIT FOR SCREENING MAMMOGRAM: ICD-10-CM

## 2022-09-16 LAB — TSH SERPL DL<=0.005 MIU/L-ACNC: 1.49 MU/L (ref 0.4–4)

## 2022-09-16 PROCEDURE — 99396 PREV VISIT EST AGE 40-64: CPT | Mod: 25 | Performed by: INTERNAL MEDICINE

## 2022-09-16 PROCEDURE — G0145 SCR C/V CYTO,THINLAYER,RESCR: HCPCS | Performed by: INTERNAL MEDICINE

## 2022-09-16 PROCEDURE — 87624 HPV HI-RISK TYP POOLED RSLT: CPT | Performed by: INTERNAL MEDICINE

## 2022-09-16 RX ORDER — VALACYCLOVIR HYDROCHLORIDE 500 MG/1
TABLET, FILM COATED ORAL
Qty: 30 TABLET | Refills: 3 | Status: SHIPPED | OUTPATIENT
Start: 2022-09-16

## 2022-09-16 ASSESSMENT — ENCOUNTER SYMPTOMS
ARTHRALGIAS: 1
DIARRHEA: 1
BREAST MASS: 0
ABDOMINAL PAIN: 1

## 2022-09-16 ASSESSMENT — PAIN SCALES - GENERAL: PAINLEVEL: MILD PAIN (2)

## 2022-09-16 NOTE — PROGRESS NOTES
SUBJECTIVE:   CC: Shaheen is an 47 year old who presents for preventive health visit.   New to me.   Runs a clothing store nearby    No regular PCP.    Has Crohn's, sees GI.  Tried Humira in the past.  At present on Lialda and prn prednisone.  She takes the latter for joint pains also.  No other medications tried.      Thyroid  On med.  Due for labs.      Patient has been advised of split billing requirements and indicates understanding: Yes  Healthy Habits:     Getting at least 3 servings of Calcium per day:  Yes    Bi-annual eye exam:  Yes    Dental care twice a year:  Yes    Sleep apnea or symptoms of sleep apnea:  None    Diet:  Gluten-free/reduced    Frequency of exercise:  4-5 days/week    Duration of exercise:  30-45 minutes    Taking medications regularly:  Yes    Medication side effects:  None    PHQ-2 Total Score: 2    Additional concerns today:  No        Today's PHQ-2 Score:   PHQ-2 ( 1999 [employer]) 9/16/2022   Q1: Little interest or pleasure in doing things 1   Q2: Feeling down, depressed or hopeless 1   PHQ-2 Score 2   PHQ-2 Total Score (12-17 Years)- Positive if 3 or more points; Administer PHQ-A if positive -   Q1: Little interest or pleasure in doing things -   Q2: Feeling down, depressed or hopeless -   PHQ-2 Score -       Abuse: Current or Past (Physical, Sexual or Emotional) - No  Do you feel safe in your environment? Yes        Social History     Tobacco Use     Smoking status: Never Smoker     Smokeless tobacco: Never Used   Substance Use Topics     Alcohol use: Yes     Comment: 2 wine daily     If you drink alcohol do you typically have >3 drinks per day or >7 drinks per week? No    Alcohol Use 9/16/2022   Prescreen: >3 drinks/day or >7 drinks/week? -   Prescreen: >3 drinks/day or >7 drinks/week? No   AUDIT SCORE  -       Reviewed orders with patient.  Reviewed health maintenance and updated orders accordingly -         FHS-7:   Breast CA Risk Assessment (FHS-7) 4/22/2021   Did any of your  "first-degree relatives have breast or ovarian cancer? Yes   Did any of your relatives have bilateral breast cancer? No       Mammogram Screening: Recommended annual mammography  Pertinent mammograms are reviewed under the imaging tab.    History of abnormal Pap smear: NO - age 30-65 PAP every 5 years with negative HPV co-testing recommended  PAP / HPV Latest Ref Rng & Units 3/6/2018   PAP (Historical) - NIL   HPV16 NEG:Negative Negative   HPV18 NEG:Negative Negative   HRHPV NEG:Negative Negative     Reviewed and updated as needed this visit by clinical staff   Tobacco  Allergies  Meds                Reviewed and updated as needed this visit by Provider                       Review of Systems   Gastrointestinal: Positive for abdominal pain and diarrhea.   Breasts:  Negative for tenderness, breast mass and discharge.   Genitourinary: Positive for pelvic pain. Negative for vaginal bleeding and vaginal discharge.   Musculoskeletal: Positive for arthralgias.          OBJECTIVE:   BP 93/64 (BP Location: Right arm, Patient Position: Sitting, Cuff Size: Adult Regular)   Pulse 77   Temp 98.2  F (36.8  C)   Resp 20   Ht 1.626 m (5' 4\")   Wt 50.8 kg (112 lb)   SpO2 96%   BMI 19.22 kg/m    Physical Exam  GENERAL: healthy, alert and no distress  EYES: Eyes grossly normal to inspection, PERRL and conjunctivae and sclerae normal  HENT: ear canals and TM's normal, nose and mouth without ulcers or lesions  NECK: no adenopathy, no asymmetry, masses, or scars and thyroid normal to palpation  RESP: lungs clear to auscultation - no rales, rhonchi or wheezes  CV: regular rate and rhythm, normal S1 S2, no S3 or S4, no murmur, click or rub, no peripheral edema and peripheral pulses strong  ABDOMEN: soft, nontender, no hepatosplenomegaly, no masses and bowel sounds normal   (female): normal female external genitalia, normal urethral meatus, vaginal mucosa pink, moist, well rugated, and normal cervix/adnexa/uterus without masses " "or discharge  MS: no gross musculoskeletal defects noted, no edema  SKIN: no suspicious lesions or rashes  NEURO: Normal strength and tone, mentation intact and speech normal  PSYCH: mentation appears normal, affect normal/bright        ASSESSMENT/PLAN:       ICD-10-CM    1. Encounter for preventive care   Age and gender appropriate preventive care and screenings are discussed.  Particular attention to personal preventive care and age appropriate lifestyle including the incorporation of healthy diet and physical activity is made.     Z00.00    2. Visit for screening mammogram  Z12.31 MA SCREENING DIGITAL BILAT - Future  (s+30)   3. Cervical cancer screening   Done today Z12.4 Pap Screen with HPV - recommended age 30 - 65 years     TSH with free T4 reflex   4. HSV infection  B00.9 valACYclovir (VALTREX) 500 MG tablet   5. Acquired hypothyroidism  E03.9 TSH with free T4 reflex     Did briefly discuss Crohns and the importance of achieving and maintaining remission.  I believe she would benefit from additional GI evaluation and consideration of medication to induce remission.        COUNSELING:  Reviewed preventive health counseling, as reflected in patient instructions    Estimated body mass index is 19.22 kg/m  as calculated from the following:    Height as of this encounter: 1.626 m (5' 4\").    Weight as of this encounter: 50.8 kg (112 lb).        She reports that she has never smoked. She has never used smokeless tobacco.      Counseling Resources:  ATP IV Guidelines  Pooled Cohorts Equation Calculator  Breast Cancer Risk Calculator  BRCA-Related Cancer Risk Assessment: FHS-7 Tool  FRAX Risk Assessment  ICSI Preventive Guidelines  Dietary Guidelines for Americans, 2010  USDA's MyPlate  ASA Prophylaxis  Lung CA Screening    Luis Daniel Davis MD  [facility]  "

## 2022-09-16 NOTE — PATIENT INSTRUCTIONS
We'll add a thyroid level to your last labs.       Please call the number below to schedule breast cancer screening:    [facility]  [address]  [place], [place] [id number] [phone] (Scheduling)

## 2022-09-20 LAB
BKR LAB AP GYN ADEQUACY: NORMAL
BKR LAB AP GYN INTERPRETATION: NORMAL
BKR LAB AP HPV REFLEX: NORMAL
BKR LAB AP PREVIOUS ABNORMAL: NORMAL
PATH REPORT.COMMENTS IMP SPEC: NORMAL
PATH REPORT.COMMENTS IMP SPEC: NORMAL
PATH REPORT.RELEVANT HX SPEC: NORMAL

## 2022-09-22 LAB
HUMAN PAPILLOMA VIRUS 16 DNA: NEGATIVE
HUMAN PAPILLOMA VIRUS 18 DNA: NEGATIVE
HUMAN PAPILLOMA VIRUS FINAL DIAGNOSIS: NORMAL
HUMAN PAPILLOMA VIRUS OTHER HR: NEGATIVE

## 2022-10-05 ENCOUNTER — TRANSFERRED RECORDS (OUTPATIENT)
Dept: HEALTH INFORMATION MANAGEMENT | Facility: CLINIC | Age: 47
End: 2022-10-05

## 2022-10-05 PROCEDURE — 88305 TISSUE EXAM BY PATHOLOGIST: CPT | Performed by: PATHOLOGY

## 2022-10-06 ENCOUNTER — LAB REQUISITION (OUTPATIENT)
Dept: LAB | Facility: CLINIC | Age: 47
End: 2022-10-06

## 2022-10-06 DIAGNOSIS — K63.89 OTHER SPECIFIED DISEASES OF INTESTINE: ICD-10-CM

## 2022-10-06 DIAGNOSIS — K63.3 ULCER OF INTESTINE: ICD-10-CM

## 2022-10-06 DIAGNOSIS — K52.9 NONINFECTIVE GASTROENTERITIS AND COLITIS, UNSPECIFIED: ICD-10-CM

## 2022-10-06 DIAGNOSIS — K50.80 CROHN'S DISEASE OF BOTH SMALL AND LARGE INTESTINE WITHOUT COMPLICATIONS (H): ICD-10-CM

## 2022-10-10 LAB
PATH REPORT.COMMENTS IMP SPEC: NORMAL
PATH REPORT.FINAL DX SPEC: NORMAL
PATH REPORT.GROSS SPEC: NORMAL
PATH REPORT.MICROSCOPIC SPEC OTHER STN: NORMAL
PATH REPORT.MICROSCOPIC SPEC OTHER STN: NORMAL
PATH REPORT.RELEVANT HX SPEC: NORMAL
PHOTO IMAGE: NORMAL

## 2022-10-30 ENCOUNTER — HEALTH MAINTENANCE LETTER (OUTPATIENT)
Age: 47
End: 2022-10-30

## 2022-11-01 ENCOUNTER — TRANSFERRED RECORDS (OUTPATIENT)
Dept: HEALTH INFORMATION MANAGEMENT | Facility: CLINIC | Age: 47
End: 2022-11-01

## 2023-04-27 ENCOUNTER — TRANSFERRED RECORDS (OUTPATIENT)
Dept: HEALTH INFORMATION MANAGEMENT | Facility: CLINIC | Age: 48
End: 2023-04-27
Payer: COMMERCIAL

## 2023-08-17 ENCOUNTER — PATIENT OUTREACH (OUTPATIENT)
Dept: CARE COORDINATION | Facility: CLINIC | Age: 48
End: 2023-08-17
Payer: COMMERCIAL

## 2023-08-31 ENCOUNTER — PATIENT OUTREACH (OUTPATIENT)
Dept: CARE COORDINATION | Facility: CLINIC | Age: 48
End: 2023-08-31
Payer: COMMERCIAL

## 2023-09-03 ENCOUNTER — HEALTH MAINTENANCE LETTER (OUTPATIENT)
Age: 48
End: 2023-09-03

## 2023-10-10 ENCOUNTER — TRANSFERRED RECORDS (OUTPATIENT)
Dept: HEALTH INFORMATION MANAGEMENT | Facility: CLINIC | Age: 48
End: 2023-10-10

## 2023-11-12 ENCOUNTER — HEALTH MAINTENANCE LETTER (OUTPATIENT)
Age: 48
End: 2023-11-12

## 2024-02-29 ENCOUNTER — TRANSFERRED RECORDS (OUTPATIENT)
Dept: HEALTH INFORMATION MANAGEMENT | Facility: CLINIC | Age: 49
End: 2024-02-29

## 2024-12-28 ENCOUNTER — HEALTH MAINTENANCE LETTER (OUTPATIENT)
Age: 49
End: 2024-12-28

## 2025-02-28 ENCOUNTER — TRANSFERRED RECORDS (OUTPATIENT)
Dept: HEALTH INFORMATION MANAGEMENT | Facility: CLINIC | Age: 50
End: 2025-02-28